# Patient Record
Sex: MALE | Race: WHITE | Employment: PART TIME | URBAN - METROPOLITAN AREA
[De-identification: names, ages, dates, MRNs, and addresses within clinical notes are randomized per-mention and may not be internally consistent; named-entity substitution may affect disease eponyms.]

---

## 2017-01-26 ENCOUNTER — TELEPHONE (OUTPATIENT)
Dept: INTERNAL MEDICINE CLINIC | Age: 27
End: 2017-01-26

## 2017-01-27 ENCOUNTER — OFFICE VISIT (OUTPATIENT)
Dept: INTERNAL MEDICINE CLINIC | Age: 27
End: 2017-01-27

## 2017-01-27 VITALS
BODY MASS INDEX: 31.67 KG/M2 | HEART RATE: 100 BPM | RESPIRATION RATE: 16 BRPM | HEIGHT: 68 IN | DIASTOLIC BLOOD PRESSURE: 70 MMHG | WEIGHT: 209 LBS | SYSTOLIC BLOOD PRESSURE: 110 MMHG

## 2017-01-27 DIAGNOSIS — M50.30 DDD (DEGENERATIVE DISC DISEASE), CERVICAL: ICD-10-CM

## 2017-01-27 DIAGNOSIS — F41.9 ANXIETY: Primary | ICD-10-CM

## 2017-01-27 DIAGNOSIS — M54.2 NECK PAIN: ICD-10-CM

## 2017-01-27 PROCEDURE — 99214 OFFICE O/P EST MOD 30 MIN: CPT | Performed by: INTERNAL MEDICINE

## 2017-01-27 RX ORDER — TIZANIDINE HYDROCHLORIDE 2 MG/1
2 CAPSULE, GELATIN COATED ORAL 2 TIMES DAILY PRN
Qty: 30 CAPSULE | Refills: 0 | Status: SHIPPED | OUTPATIENT
Start: 2017-01-27 | End: 2017-01-30

## 2017-01-27 RX ORDER — LITHIUM CARBONATE 300 MG/1
300 CAPSULE ORAL 2 TIMES DAILY WITH MEALS
COMMUNITY
Start: 2017-01-25 | End: 2020-12-23

## 2017-01-27 RX ORDER — ARIPIPRAZOLE 5 MG/1
5 TABLET ORAL DAILY
COMMUNITY
End: 2020-12-23

## 2017-01-27 RX ORDER — HYDROXYZINE PAMOATE 25 MG/1
25 CAPSULE ORAL 2 TIMES DAILY
COMMUNITY
Start: 2017-01-25 | End: 2020-12-23

## 2017-01-27 ASSESSMENT — ENCOUNTER SYMPTOMS
SHORTNESS OF BREATH: 0
ABDOMINAL PAIN: 0

## 2017-01-30 RX ORDER — TIZANIDINE 2 MG/1
2 TABLET ORAL 2 TIMES DAILY PRN
Qty: 30 TABLET | Refills: 0 | Status: SHIPPED | OUTPATIENT
Start: 2017-01-30 | End: 2017-01-31

## 2017-01-31 RX ORDER — CYCLOBENZAPRINE HCL 10 MG
10 TABLET ORAL 2 TIMES DAILY PRN
Qty: 30 TABLET | Refills: 0 | OUTPATIENT
Start: 2017-01-31 | End: 2017-02-10

## 2017-02-13 ENCOUNTER — OFFICE VISIT (OUTPATIENT)
Dept: ORTHOPEDIC SURGERY | Age: 27
End: 2017-02-13

## 2017-02-13 VITALS — WEIGHT: 213 LBS | HEIGHT: 70 IN | BODY MASS INDEX: 30.49 KG/M2 | RESPIRATION RATE: 16 BRPM

## 2017-02-13 DIAGNOSIS — S63.501A SPRAIN OF WRIST, RIGHT, INITIAL ENCOUNTER: Primary | ICD-10-CM

## 2017-02-13 PROCEDURE — 99203 OFFICE O/P NEW LOW 30 MIN: CPT | Performed by: ORTHOPAEDIC SURGERY

## 2020-12-21 PROBLEM — Z00.00 PREVENTATIVE HEALTH CARE: Status: ACTIVE | Noted: 2020-12-21

## 2020-12-31 ENCOUNTER — HOSPITAL ENCOUNTER (EMERGENCY)
Age: 30
Discharge: HOME | End: 2020-12-31
Attending: EMERGENCY MEDICINE
Payer: COMMERCIAL

## 2020-12-31 ENCOUNTER — APPOINTMENT (OUTPATIENT)
Dept: CT IMAGING | Age: 30
End: 2020-12-31
Payer: COMMERCIAL

## 2020-12-31 VITALS
DIASTOLIC BLOOD PRESSURE: 79 MMHG | RESPIRATION RATE: 12 BRPM | OXYGEN SATURATION: 100 % | TEMPERATURE: 97.2 F | HEIGHT: 70 IN | SYSTOLIC BLOOD PRESSURE: 156 MMHG | BODY MASS INDEX: 23.48 KG/M2 | WEIGHT: 164.02 LBS | HEART RATE: 72 BPM

## 2020-12-31 DIAGNOSIS — R11.2 NON-INTRACTABLE VOMITING WITH NAUSEA, UNSPECIFIED VOMITING TYPE: Primary | ICD-10-CM

## 2020-12-31 DIAGNOSIS — R10.13 ABDOMINAL PAIN, EPIGASTRIC: ICD-10-CM

## 2020-12-31 DIAGNOSIS — E87.20 LACTIC ACIDOSIS: ICD-10-CM

## 2020-12-31 LAB
A/G RATIO: 1.4 (ref 1.1–2.2)
ALBUMIN SERPL-MCNC: 4.3 G/DL (ref 3.4–5)
ALP BLD-CCNC: 68 U/L (ref 40–129)
ALT SERPL-CCNC: 214 U/L (ref 10–40)
AMORPHOUS: ABNORMAL /HPF
AMPHETAMINE SCREEN, URINE: NORMAL
ANION GAP SERPL CALCULATED.3IONS-SCNC: 15 MMOL/L (ref 3–16)
AST SERPL-CCNC: 207 U/L (ref 15–37)
BARBITURATE SCREEN URINE: NORMAL
BASOPHILS ABSOLUTE: 0 K/UL (ref 0–0.2)
BASOPHILS RELATIVE PERCENT: 0.2 %
BENZODIAZEPINE SCREEN, URINE: NORMAL
BILIRUB SERPL-MCNC: 0.8 MG/DL (ref 0–1)
BILIRUBIN URINE: ABNORMAL
BLOOD, URINE: NEGATIVE
BUN BLDV-MCNC: 17 MG/DL (ref 7–20)
CALCIUM SERPL-MCNC: 9.8 MG/DL (ref 8.3–10.6)
CANNABINOID SCREEN URINE: NORMAL
CHLORIDE BLD-SCNC: 104 MMOL/L (ref 99–110)
CLARITY: CLEAR
CO2: 25 MMOL/L (ref 21–32)
COCAINE METABOLITE SCREEN URINE: NORMAL
COLOR: YELLOW
CREAT SERPL-MCNC: 0.8 MG/DL (ref 0.9–1.3)
EOSINOPHILS ABSOLUTE: 0 K/UL (ref 0–0.6)
EOSINOPHILS RELATIVE PERCENT: 0.2 %
EPITHELIAL CELLS, UA: ABNORMAL /HPF (ref 0–5)
GFR AFRICAN AMERICAN: >60
GFR NON-AFRICAN AMERICAN: >60
GLOBULIN: 3.1 G/DL
GLUCOSE BLD-MCNC: 122 MG/DL (ref 70–99)
GLUCOSE URINE: NEGATIVE MG/DL
HCT VFR BLD CALC: 45.9 % (ref 40.5–52.5)
HEMOGLOBIN: 15.9 G/DL (ref 13.5–17.5)
KETONES, URINE: >=80 MG/DL
LACTIC ACID: 1.3 MMOL/L (ref 0.4–2)
LACTIC ACID: 3.2 MMOL/L (ref 0.4–2)
LEUKOCYTE ESTERASE, URINE: NEGATIVE
LIPASE: 20 U/L (ref 13–60)
LYMPHOCYTES ABSOLUTE: 0.5 K/UL (ref 1–5.1)
LYMPHOCYTES RELATIVE PERCENT: 5 %
Lab: NORMAL
MCH RBC QN AUTO: 30.7 PG (ref 26–34)
MCHC RBC AUTO-ENTMCNC: 34.7 G/DL (ref 31–36)
MCV RBC AUTO: 88.6 FL (ref 80–100)
METHADONE SCREEN, URINE: NORMAL
MICROSCOPIC EXAMINATION: YES
MONOCYTES ABSOLUTE: 0.8 K/UL (ref 0–1.3)
MONOCYTES RELATIVE PERCENT: 7.8 %
MUCUS: ABNORMAL /LPF
NEUTROPHILS ABSOLUTE: 8.4 K/UL (ref 1.7–7.7)
NEUTROPHILS RELATIVE PERCENT: 86.8 %
NITRITE, URINE: NEGATIVE
OPIATE SCREEN URINE: NORMAL
OXYCODONE URINE: NORMAL
PDW BLD-RTO: 12.1 % (ref 12.4–15.4)
PH UA: 8.5
PH UA: 8.5 (ref 5–8)
PHENCYCLIDINE SCREEN URINE: NORMAL
PLATELET # BLD: 215 K/UL (ref 135–450)
PMV BLD AUTO: 7.7 FL (ref 5–10.5)
POTASSIUM SERPL-SCNC: 3.6 MMOL/L (ref 3.5–5.1)
PROPOXYPHENE SCREEN: NORMAL
PROTEIN UA: ABNORMAL MG/DL
RBC # BLD: 5.18 M/UL (ref 4.2–5.9)
RBC UA: ABNORMAL /HPF (ref 0–4)
SODIUM BLD-SCNC: 144 MMOL/L (ref 136–145)
SPECIFIC GRAVITY UA: 1.02 (ref 1–1.03)
TOTAL PROTEIN: 7.4 G/DL (ref 6.4–8.2)
URINE REFLEX TO CULTURE: ABNORMAL
URINE TYPE: ABNORMAL
UROBILINOGEN, URINE: 0.2 E.U./DL
WBC # BLD: 9.6 K/UL (ref 4–11)
WBC UA: ABNORMAL /HPF (ref 0–5)

## 2020-12-31 PROCEDURE — 96375 TX/PRO/DX INJ NEW DRUG ADDON: CPT

## 2020-12-31 PROCEDURE — 99284 EMERGENCY DEPT VISIT MOD MDM: CPT

## 2020-12-31 PROCEDURE — 74177 CT ABD & PELVIS W/CONTRAST: CPT

## 2020-12-31 PROCEDURE — 6360000002 HC RX W HCPCS: Performed by: EMERGENCY MEDICINE

## 2020-12-31 PROCEDURE — 80307 DRUG TEST PRSMV CHEM ANLYZR: CPT

## 2020-12-31 PROCEDURE — 85025 COMPLETE CBC W/AUTO DIFF WBC: CPT

## 2020-12-31 PROCEDURE — 2580000003 HC RX 258: Performed by: EMERGENCY MEDICINE

## 2020-12-31 PROCEDURE — 6370000000 HC RX 637 (ALT 250 FOR IP): Performed by: EMERGENCY MEDICINE

## 2020-12-31 PROCEDURE — 83690 ASSAY OF LIPASE: CPT

## 2020-12-31 PROCEDURE — 36415 COLL VENOUS BLD VENIPUNCTURE: CPT

## 2020-12-31 PROCEDURE — 83605 ASSAY OF LACTIC ACID: CPT

## 2020-12-31 PROCEDURE — 80053 COMPREHEN METABOLIC PANEL: CPT

## 2020-12-31 PROCEDURE — 96361 HYDRATE IV INFUSION ADD-ON: CPT

## 2020-12-31 PROCEDURE — 81001 URINALYSIS AUTO W/SCOPE: CPT

## 2020-12-31 PROCEDURE — 96374 THER/PROPH/DIAG INJ IV PUSH: CPT

## 2020-12-31 PROCEDURE — 6360000004 HC RX CONTRAST MEDICATION: Performed by: EMERGENCY MEDICINE

## 2020-12-31 RX ORDER — MORPHINE SULFATE 4 MG/ML
4 INJECTION, SOLUTION INTRAMUSCULAR; INTRAVENOUS
Status: DISCONTINUED | OUTPATIENT
Start: 2020-12-31 | End: 2020-12-31 | Stop reason: HOSPADM

## 2020-12-31 RX ORDER — HYOSCYAMINE SULFATE 0.125 MG
0.12 TABLET ORAL EVERY 4 HOURS PRN
Qty: 30 TABLET | Refills: 0 | Status: SHIPPED | OUTPATIENT
Start: 2020-12-31 | End: 2020-12-31 | Stop reason: SDUPTHER

## 2020-12-31 RX ORDER — ONDANSETRON 2 MG/ML
8 INJECTION INTRAMUSCULAR; INTRAVENOUS ONCE
Status: COMPLETED | OUTPATIENT
Start: 2020-12-31 | End: 2020-12-31

## 2020-12-31 RX ORDER — 0.9 % SODIUM CHLORIDE 0.9 %
1000 INTRAVENOUS SOLUTION INTRAVENOUS ONCE
Status: COMPLETED | OUTPATIENT
Start: 2020-12-31 | End: 2020-12-31

## 2020-12-31 RX ORDER — PROMETHAZINE HYDROCHLORIDE 25 MG/1
25 TABLET ORAL EVERY 6 HOURS PRN
Qty: 10 TABLET | Refills: 0 | Status: SHIPPED | OUTPATIENT
Start: 2020-12-31 | End: 2020-12-31 | Stop reason: SDUPTHER

## 2020-12-31 RX ORDER — HYOSCYAMINE SULFATE 0.125 MG
0.12 TABLET ORAL EVERY 4 HOURS PRN
Qty: 30 TABLET | Refills: 0 | Status: SHIPPED | OUTPATIENT
Start: 2020-12-31 | End: 2022-01-21 | Stop reason: CLARIF

## 2020-12-31 RX ORDER — PROMETHAZINE HYDROCHLORIDE 25 MG/ML
25 INJECTION, SOLUTION INTRAMUSCULAR; INTRAVENOUS ONCE
Status: COMPLETED | OUTPATIENT
Start: 2020-12-31 | End: 2020-12-31

## 2020-12-31 RX ORDER — PROMETHAZINE HYDROCHLORIDE 25 MG/1
25 TABLET ORAL EVERY 6 HOURS PRN
Qty: 10 TABLET | Refills: 0 | Status: SHIPPED | OUTPATIENT
Start: 2020-12-31 | End: 2021-01-07

## 2020-12-31 RX ADMIN — ONDANSETRON 8 MG: 2 INJECTION INTRAMUSCULAR; INTRAVENOUS at 13:07

## 2020-12-31 RX ADMIN — MORPHINE SULFATE 4 MG: 4 INJECTION INTRAVENOUS at 13:07

## 2020-12-31 RX ADMIN — HYDROMORPHONE HYDROCHLORIDE 0.5 MG: 1 INJECTION, SOLUTION INTRAMUSCULAR; INTRAVENOUS; SUBCUTANEOUS at 14:08

## 2020-12-31 RX ADMIN — IOVERSOL 100 ML: 678 INJECTION INTRA-ARTERIAL; INTRAVENOUS at 13:40

## 2020-12-31 RX ADMIN — SODIUM CHLORIDE 1000 ML: 9 INJECTION, SOLUTION INTRAVENOUS at 12:51

## 2020-12-31 RX ADMIN — SODIUM CHLORIDE 1000 ML: 9 INJECTION, SOLUTION INTRAVENOUS at 14:08

## 2020-12-31 RX ADMIN — HYOSCYAMINE SULFATE 125 MCG: 0.12 TABLET ORAL; SUBLINGUAL at 14:04

## 2020-12-31 RX ADMIN — PROMETHAZINE HYDROCHLORIDE 25 MG: 25 INJECTION INTRAMUSCULAR; INTRAVENOUS at 14:04

## 2020-12-31 ASSESSMENT — PAIN DESCRIPTION - PAIN TYPE
TYPE: ACUTE PAIN
TYPE: ACUTE PAIN

## 2020-12-31 ASSESSMENT — PAIN DESCRIPTION - DESCRIPTORS
DESCRIPTORS: CRAMPING

## 2020-12-31 ASSESSMENT — PAIN SCALES - GENERAL
PAINLEVEL_OUTOF10: 10
PAINLEVEL_OUTOF10: 8
PAINLEVEL_OUTOF10: 10

## 2020-12-31 ASSESSMENT — PAIN - FUNCTIONAL ASSESSMENT: PAIN_FUNCTIONAL_ASSESSMENT: 0-10

## 2020-12-31 ASSESSMENT — PAIN DESCRIPTION - LOCATION
LOCATION: ABDOMEN
LOCATION: ABDOMEN;BACK

## 2020-12-31 NOTE — ED NOTES
Unable to lie still, up and around room and area. Moaning, grunting. Multiple small emesis.      Rissa Samuels RN  12/31/20 2278

## 2020-12-31 NOTE — ED PROVIDER NOTES
[address] [facility] PROVIDER NOTE    Patient Identification  Pt Name: Yaquelin Anand  MRN: [id number]  Denisa 1990  Date of evaluation: 12/31/2020  Provider: Constanza Rios MD  PCP: Lin Redd MD    Chief Complaint  Abdominal Pain (severe cramping and pain x 6 hours) and Emesis (x 3 hours, no diarrhea)      HPI  (History provided by patient)  This is a 26 y.o. male who was brought in by self for sudden onset of severe epigastric abdominal pain starting approximately 6 hours ago. He then developed vomiting approximately 3 hours after that. He has not had any diarrhea or constipation. Patient states he felt well before this, including feeling well going to bed last night. He has not been ill recently. He denies any fever or chills. Pain is localized to his epigastrium, described as sharp and cramping, with radiation to the back. It is localized bilaterally. Nothing seems to make it better or worse. Patient is denying associated chest pain or shortness of breath. He has not had hematemesis or hematochezia. He has never had anything like this before. He has had no recent sick contacts. He denies illicit drug use or alcohol use. ROS  10 systems reviewed, pertinent positives/negatives per HPI otherwise noted to be negative. I have reviewed the following nursing documentation:  Allergies: Patient has no known allergies. Past medical history:   Past Medical History:   Diagnosis Date    Asthma      Past surgical history: History reviewed. No pertinent surgical history. Home medications:   Current Discharge Medication List      CONTINUE these medications which have NOT CHANGED    Details   atorvastatin (LIPITOR) 10 MG tablet Take 1 tablet by mouth daily  Qty: 30 tablet, Refills: 3             Social history:  reports that he has never smoked. He has never used smokeless tobacco. He reports current alcohol use. He reports that he does not use drugs.     Family history:  History reviewed. No pertinent family history. Exam  ED Triage Vitals [12/31/20 1229]   BP Temp Temp Source Pulse Resp SpO2 Height Weight   (!) 156/79 97.2 °F (36.2 °C) Skin 72 12 100 % 5' 10\" (1.778 m) 164 lb 0.4 oz (74.4 kg)     Nursing note and vitals reviewed. Constitutional: Appears uncomfortable and in acute pain distress. HENT:      Head: Normocephalic and atraumatic. Ears: External ears normal.      Nose: Nose normal.     Mouth: Membrane mucosa dry. Eyes: Anicteric sclera. No discharge. Neck: Supple. Trachea midline. Cardiovascular: RRR; no murmurs, rubs, or gallops. Pulmonary/Chest: Effort normal. No respiratory distress. CTAB. No stridor. No wheezes. No rales. Abdominal: Firm, nondistended abdomen. Tender to palpation in the epigastrium and bilateral upper quadrants with guarding, but no rebound. Other quadrants tender without guarding. Bowel sounds diminished. Musculoskeletal: Moves all extremities. No gross deformity. Neurological: Alert and oriented. Face symmetric. Speech is clear. Skin: Warm and dry. No rash. Psychiatric: Normal mood and affect. Behavior is normal.    Radiology  CT ABDOMEN PELVIS W IV CONTRAST Additional Contrast? None   Final Result   Negative CT of the abdomen/pelvis as discussed.              Labs  Results for orders placed or performed during the hospital encounter of 12/31/20   CBC Auto Differential   Result Value Ref Range    WBC 9.6 4.0 - 11.0 K/uL    RBC 5.18 4.20 - 5.90 M/uL    Hemoglobin 15.9 13.5 - 17.5 g/dL    Hematocrit 45.9 40.5 - 52.5 %    MCV 88.6 80.0 - 100.0 fL    MCH 30.7 26.0 - 34.0 pg    MCHC 34.7 31.0 - 36.0 g/dL    RDW 12.1 (L) 12.4 - 15.4 %    Platelets 218 950 - 098 K/uL    MPV 7.7 5.0 - 10.5 fL    Neutrophils % 86.8 %    Lymphocytes % 5.0 %    Monocytes % 7.8 %    Eosinophils % 0.2 %    Basophils % 0.2 %    Neutrophils Absolute 8.4 (H) 1.7 - 7.7 K/uL    Lymphocytes Absolute 0.5 (L) 1.0 - 5.1 K/uL    Monocytes Absolute 0.8 0.0 - 1.3 K/uL    Eosinophils Absolute 0.0 0.0 - 0.6 K/uL    Basophils Absolute 0.0 0.0 - 0.2 K/uL   Comprehensive Metabolic Panel   Result Value Ref Range    Sodium 144 136 - 145 mmol/L    Potassium 3.6 3.5 - 5.1 mmol/L    Chloride 104 99 - 110 mmol/L    CO2 25 21 - 32 mmol/L    Anion Gap 15 3 - 16    Glucose 122 (H) 70 - 99 mg/dL    BUN 17 7 - 20 mg/dL    CREATININE 0.8 (L) 0.9 - 1.3 mg/dL    GFR Non-African American >60 >60    GFR African American >60 >60    Calcium 9.8 8.3 - 10.6 mg/dL    Total Protein 7.4 6.4 - 8.2 g/dL    Alb 4.3 3.4 - 5.0 g/dL    Albumin/Globulin Ratio 1.4 1.1 - 2.2    Total Bilirubin 0.8 0.0 - 1.0 mg/dL    Alkaline Phosphatase 68 40 - 129 U/L     (H) 10 - 40 U/L     (H) 15 - 37 U/L    Globulin 3.1 g/dL   Urinalysis Reflex to Culture    Specimen: Urine, clean catch   Result Value Ref Range    Color, UA Yellow Straw/Yellow    Clarity, UA Clear Clear    Glucose, Ur Negative Negative mg/dL    Bilirubin Urine SMALL (A) Negative    Ketones, Urine >=80 (A) Negative mg/dL    Specific Gravity, UA 1.020 1.005 - 1.030    Blood, Urine Negative Negative    pH, UA 8.5 (A) 5.0 - 8.0    Protein, UA TRACE (A) Negative mg/dL    Urobilinogen, Urine 0.2 <2.0 E.U./dL    Nitrite, Urine Negative Negative    Leukocyte Esterase, Urine Negative Negative    Microscopic Examination YES     Urine Type NotGiven     Urine Reflex to Culture Not Indicated    Lactic Acid, Plasma   Result Value Ref Range    Lactic Acid 3.2 (H) 0.4 - 2.0 mmol/L   Urine Drug Screen   Result Value Ref Range    Amphetamine Screen, Urine Neg Negative <1000ng/mL    Barbiturate Screen, Ur Neg Negative <200 ng/mL    Benzodiazepine Screen, Urine Neg Negative <200 ng/mL    Cannabinoid Scrn, Ur Neg Negative <50 ng/mL    Cocaine Metabolite Screen, Urine Neg Negative <300 ng/mL    Opiate Scrn, Ur Neg Negative <300 ng/mL    PCP Screen, Urine Neg Negative <25 ng/mL    Methadone Screen, Urine Neg Negative <300 ng/mL    Propoxyphene Scrn, Ur Neg Negative <300 ng/mL    Oxycodone Urine Neg Negative <100 ng/ml    pH, UA 8.5     Drug Screen Comment: see below    Lipase   Result Value Ref Range    Lipase 20.0 13.0 - 60.0 U/L   Microscopic Urinalysis   Result Value Ref Range    Mucus, UA 2+ (A) None Seen /LPF    WBC, UA 0-2 0 - 5 /HPF    RBC, UA None seen 0 - 4 /HPF    Epithelial Cells, UA 0-1 0 - 5 /HPF    Amorphous, UA 1+ /HPF   Lactic Acid, Plasma   Result Value Ref Range    Lactic Acid 1.3 0.4 - 2.0 mmol/L         MDM and ED Course  Patient was in severe pain on arrival I was concerned for serious diagnoses. I performed a broad work-up, occluding CT of his abdomen. Fortunately, his work-up was mostly unremarkable. He had no leukocytosis, liver enzymes are normal, and his kidney function remained normal.  Further, CT showed no evidence of acute abnormality or surgical abdomen. He did show signs of severe dehydration with ketones in his urine and a lactic acid of 3.2. Despite this, the patient was not acidotic on his metabolic panel. After receiving 2 L of IV fluid, he showed significant improvement in his lactic acid returned to normal.  The patient's pain almost completely resolved after treatment in the emergency department, as did his nausea and vomiting. He was able to tolerate oral fluid intake at the end of his visit, which is also reassuring. At this time, he is safe and appropriate for discharge home. I estimate there is LOW risk for ACUTE APPENDICITIS, BOWEL OBSTRUCTION, CHOLECYSTITIS, DIVERTICULITIS, INCARCERATED HERNIA, PANCREATITIS, MESENTERIC ISCHEMIA, ABDOMINAL AORTIC ANEURYSM/DISSECTION, PERFORATED BOWEL, and PERFORATED ULCER, thus I consider the discharge disposition reasonable. Zeus Rossi and I have discussed the diagnosis and risks, and we agree with discharging home with PCP follow-up. We also discussed returning to the Emergency Department immediately if new or worsening symptoms occur.  We have discussed the symptoms which are most concerning (e.g., bloody stool, fever, changing or worsening pain, intractable vomiting, chest pain) that necessitate immediate return. Final Impression  1. Non-intractable vomiting with nausea, unspecified vomiting type    2. Abdominal pain, epigastric    3. Lactic acidosis        Blood pressure (!) 156/79, pulse 72, temperature 97.2 °F (36.2 °C), temperature source Skin, resp. rate 12, height 5' 10\" (1.778 m), weight 164 lb 0.4 oz (74.4 kg), SpO2 100 %. Disposition:  Discharge      Patient Referrals:  Maday Agee MD  [facility] [address] [facility]  [phone]    In 2 days  for re-evaluation    [place] [address]  [phone]    As needed, If symptoms worsen or new symptoms develop      Discharge Medications:  Current Discharge Medication List      START taking these medications    Details   hyoscyamine (LEVSIN) 125 MCG tablet Take 1 tablet by mouth every 4 hours as needed for Cramping  Qty: 30 tablet, Refills: 0      promethazine (PHENERGAN) 25 MG tablet Take 1 tablet by mouth every 6 hours as needed for Nausea  Qty: 10 tablet, Refills: 0             This chart was generated using the [address] [place] [address] [address] dictation system. I created this record but it may contain dictation errors given the limitations of this technology.        Liz Lyons MD  12/31/20 1826

## 2020-12-31 NOTE — ED NOTES
Continues to vomit small amounts, Dr. Luis Snyder aware.  Very restless, unable to lie still     Pooja Marsh RN  12/31/20 8025

## 2021-01-20 PROBLEM — Z00.00 PREVENTATIVE HEALTH CARE: Status: RESOLVED | Noted: 2020-12-21 | Resolved: 2021-01-20

## 2021-03-21 ENCOUNTER — HOSPITAL ENCOUNTER (EMERGENCY)
Age: 31
Discharge: HOME | End: 2021-03-21
Attending: EMERGENCY MEDICINE
Payer: COMMERCIAL

## 2021-03-21 VITALS
BODY MASS INDEX: 23.63 KG/M2 | TEMPERATURE: 98.1 F | HEART RATE: 55 BPM | OXYGEN SATURATION: 98 % | WEIGHT: 164.68 LBS | RESPIRATION RATE: 17 BRPM

## 2021-03-21 DIAGNOSIS — S61.210A LACERATION OF RIGHT INDEX FINGER WITHOUT FOREIGN BODY WITHOUT DAMAGE TO NAIL, INITIAL ENCOUNTER: Primary | ICD-10-CM

## 2021-03-21 DIAGNOSIS — Z23 NEED FOR TETANUS BOOSTER: ICD-10-CM

## 2021-03-21 PROCEDURE — 90471 IMMUNIZATION ADMIN: CPT | Performed by: EMERGENCY MEDICINE

## 2021-03-21 PROCEDURE — 99284 EMERGENCY DEPT VISIT MOD MDM: CPT

## 2021-03-21 PROCEDURE — 90715 TDAP VACCINE 7 YRS/> IM: CPT | Performed by: EMERGENCY MEDICINE

## 2021-03-21 PROCEDURE — 12002 RPR S/N/AX/GEN/TRNK2.6-7.5CM: CPT

## 2021-03-21 PROCEDURE — 6360000002 HC RX W HCPCS: Performed by: EMERGENCY MEDICINE

## 2021-03-21 RX ADMIN — TETANUS TOXOID, REDUCED DIPHTHERIA TOXOID AND ACELLULAR PERTUSSIS VACCINE, ADSORBED 0.5 ML: 5; 2.5; 8; 8; 2.5 SUSPENSION INTRAMUSCULAR at 21:51

## 2021-03-21 ASSESSMENT — PAIN SCALES - GENERAL
PAINLEVEL_OUTOF10: 1
PAINLEVEL_OUTOF10: 2

## 2021-03-21 ASSESSMENT — PAIN - FUNCTIONAL ASSESSMENT: PAIN_FUNCTIONAL_ASSESSMENT: 0-10

## 2021-03-21 ASSESSMENT — PAIN DESCRIPTION - PAIN TYPE: TYPE: ACUTE PAIN

## 2021-03-22 NOTE — ED PROVIDER NOTES
CHIEF COMPLAINT  Laceration (Right index finger laceration with a non serrated knife. bleeding stopped upon arrival. gaping. Denies pain but burning sensation)      HISTORY OF PRESENT ILLNESS  Pat Castillo is a 26 y.o. male presents to the ED with right index finger laceration, accidentally sliced palmar aspect of finger while trying to cut something, states he can still move everything ok so he doesn't think he cut any tendons, was more worried he cut a blood vessel, denies concern for foreign body, knife was intact, with a clean non-serrated knife just prior to arrival, he was concerned because he couldn't get the bleeding to stop on his own, usually just sews them up himself, unsure last tetanus shot, does not believe it's been within the past 5 years so he is due, no hx of diabetes or poor wound healing/frequent infections, no recent fever/illness, no cough or covid exposure he is aware of. No other complaints, modifying factors or associated symptoms. I have reviewed the following from the nursing documentation. Past Medical History:   Diagnosis Date    Asthma      History reviewed. No pertinent surgical history. History reviewed. No pertinent family history. Social History     Socioeconomic History    Marital status: Single     Spouse name: Not on file    Number of children: Not on file    Years of education: Not on file    Highest education level: Not on file   Occupational History    Not on file   Social Needs    Financial resource strain: Not hard at all    Food insecurity     Worry: Never true     Inability: Never true   [employer] needs     Medical: No     Non-medical: No   Tobacco Use    Smoking status: Never Smoker    Smokeless tobacco: Never Used   Substance and Sexual Activity    Alcohol use: Not Currently     Comment:  Occ    Drug use: No    Sexual activity: Not on file   Lifestyle    Physical activity     Days per week: Not on file     Minutes per session: Not on file    Stress: Not on file   Relationships    Social connections     Talks on phone: Not on file     Gets together: Not on file     Attends Buddhism service: Not on file     Active member of club or organization: Not on file     Attends meetings of clubs or organizations: Not on file     Relationship status: Not on file    Intimate partner violence     Fear of current or ex partner: Not on file     Emotionally abused: Not on file     Physically abused: Not on file     Forced sexual activity: Not on file   Other Topics Concern    Not on file   Social History Narrative    Not on file     No current facility-administered medications for this encounter. Current Outpatient Medications   Medication Sig Dispense Refill    hyoscyamine (LEVSIN) 125 MCG tablet Take 1 tablet by mouth every 4 hours as needed for Cramping 30 tablet 0     No Known Allergies    REVIEW OF SYSTEMS  10 systems reviewed, pertinent positives per HPI otherwise noted to be negative. PHYSICAL EXAM  Pulse 55   Temp 98.1 °F (36.7 °C) (Oral)   Resp 17   Wt 164 lb 10.9 oz (74.7 kg)   SpO2 98%   BMI 23.63 kg/m²   GENERAL APPEARANCE: Awake and alert. Cooperative. No acute distress  HEAD: Normocephalic. Atraumatic. EYES: PERRL. EOM's grossly intact. ENT: Mucous membranes are moist. Airway patent  NECK: Supple. No rigidity  HEART: RRR. No murmurs  LUNGS: Respirations nonlabored. Lungs are CTAB. EXTREMITIES: No peripheral edema. Moves all extremities equally. R upper extremity: 2+ radial pulse, <2sec cap refill in all digits, distal sensation intact in all digits, nml hand tendon exam, full flexion/extension and strength in R index finger intact, approx 3cm laceration across proximal palmar aspect of index finger, along crease of MCP joint and extending into medial aspect of digit, no evidence of foreign body or gross contamination, no visible tendon, oozing dark red blood, no pulsatile bleeding  SKIN: Warm and dry. No acute rashes. NEUROLOGICAL: Alert and oriented. No gross facial drooping. Normal speech, steady gait  PSYCHIATRIC: Normal mood and affect. PROCEDURES  Patient Name: Courtney Turner   Medical Record Number: [id number]  Date: 3/25/2021   Time: 5:00 PM   Room/Bed: QC 07/07  Laceration Repair Procedure Note  Indication: Laceration, R index finger    Procedure: The patient was placed in the appropriate position and anesthesia around the laceration was not performed at the patient's request. The area was then cleansed using chlorhexidine, irrigated with normal saline, explored with no foreign bodies discovered and no tendon injury noted and draped in a sterile fashion. Laceration through subQ tissue, no visible tendon injury. The laceration was closed with 4-0 Prolene using interrupted sutures. There were no additional lacerations requiring repair. The wound area was then dressed with bacitracin, a sterile dressing, gauze and tape. Total repaired wound length: 3 cm. Other Items: Suture count: 5    The patient tolerated the procedure well. Complications: None, EBL 3cc      ED COURSE/MDM  Patient seen and evaluated. Old records reviewed. 31yo M w/ R index finger laceration, d/t location and gaping of skin, repaired w/ sutures, discussed proper wound care and given dressings/abx ointment for home, patient required tetanus booster and was agreeable to receive this, no further bleeding, no hematoma formation, suture removal by medical professional encouraged in 1 week, low suspicion for fracture/dislocation/foreign body, neurovascularly intact and tendon exam intact, Strict return precautions given, all questions answered, will return if any worsening symptoms or new concerns, see AVS for further discharge information, patient verbalized understanding of plan, felt comfortable going home.       Orders Placed This Encounter   Medications    Tetanus-Diphth-Acell Pertussis (BOOSTRIX) injection 0.5 mL     ED Course as of Mar 25 1700   Sun Mar 21, 2021   2202 Five 4-0 Prolene sutures placed in the proximal right index finger, hemostasis achieved. [SY]      ED Course User Index  [SY] Indy Vora DO       CLINICAL IMPRESSION  1. Laceration of right index finger without foreign body without damage to nail, initial encounter    2. Need for tetanus booster        Pulse 55, temperature 98.1 °F (36.7 °C), temperature source Oral, resp. rate 17, weight 164 lb 10.9 oz (74.7 kg), SpO2 98 %. DISPOSITION  Ruy Khalil was discharged to home in stable condition.                   Indy Vora DO  03/25/21 9992

## 2021-03-22 NOTE — ED NOTES
Bacitracin applied over sutured right index finger. Secured with 2x2 and light ace wrap. Pt tolerated well.       Jacquelyn Anders RN  03/21/21 8264

## 2021-03-22 NOTE — ED NOTES
Cleansed right index finger laceration with antiseptic soap and saline.  Pt tolerated well     Latonya Mirza RN  03/21/21 2106       Latonya Mirza RN  03/21/21 2106

## 2021-12-20 ENCOUNTER — APPOINTMENT (OUTPATIENT)
Dept: GENERAL RADIOLOGY | Age: 31
End: 2021-12-20
Payer: COMMERCIAL

## 2021-12-20 ENCOUNTER — HOSPITAL ENCOUNTER (EMERGENCY)
Age: 31
Discharge: HOME | End: 2021-12-20
Attending: EMERGENCY MEDICINE
Payer: COMMERCIAL

## 2021-12-20 VITALS
OXYGEN SATURATION: 99 % | WEIGHT: 148.81 LBS | RESPIRATION RATE: 20 BRPM | SYSTOLIC BLOOD PRESSURE: 130 MMHG | HEIGHT: 70 IN | HEART RATE: 84 BPM | DIASTOLIC BLOOD PRESSURE: 85 MMHG | BODY MASS INDEX: 21.3 KG/M2 | TEMPERATURE: 98.1 F

## 2021-12-20 DIAGNOSIS — R19.7 NAUSEA VOMITING AND DIARRHEA: ICD-10-CM

## 2021-12-20 DIAGNOSIS — R07.89 CHEST WALL PAIN: ICD-10-CM

## 2021-12-20 DIAGNOSIS — R11.2 NAUSEA VOMITING AND DIARRHEA: ICD-10-CM

## 2021-12-20 DIAGNOSIS — U07.1 COVID-19: Primary | ICD-10-CM

## 2021-12-20 LAB
BASOPHILS ABSOLUTE: 0 K/UL (ref 0–0.2)
BASOPHILS RELATIVE PERCENT: 0.3 %
EOSINOPHILS ABSOLUTE: 0 K/UL (ref 0–0.6)
EOSINOPHILS RELATIVE PERCENT: 0.1 %
HCT VFR BLD CALC: 47.9 % (ref 40.5–52.5)
HEMOGLOBIN: 15.6 G/DL (ref 13.5–17.5)
LYMPHOCYTES ABSOLUTE: 2 K/UL (ref 1–5.1)
LYMPHOCYTES RELATIVE PERCENT: 23.4 %
MCH RBC QN AUTO: 29.1 PG (ref 26–34)
MCHC RBC AUTO-ENTMCNC: 32.5 G/DL (ref 31–36)
MCV RBC AUTO: 89.5 FL (ref 80–100)
MONOCYTES ABSOLUTE: 0.6 K/UL (ref 0–1.3)
MONOCYTES RELATIVE PERCENT: 6.3 %
NEUTROPHILS ABSOLUTE: 6.1 K/UL (ref 1.7–7.7)
NEUTROPHILS RELATIVE PERCENT: 69.9 %
PDW BLD-RTO: 13.1 % (ref 12.4–15.4)
PLATELET # BLD: 237 K/UL (ref 135–450)
PMV BLD AUTO: 8.2 FL (ref 5–10.5)
RBC # BLD: 5.35 M/UL (ref 4.2–5.9)
SARS-COV-2, NAAT: DETECTED
WBC # BLD: 8.7 K/UL (ref 4–11)

## 2021-12-20 PROCEDURE — 99283 EMERGENCY DEPT VISIT LOW MDM: CPT

## 2021-12-20 PROCEDURE — 71046 X-RAY EXAM CHEST 2 VIEWS: CPT

## 2021-12-20 PROCEDURE — 93005 ELECTROCARDIOGRAM TRACING: CPT | Performed by: EMERGENCY MEDICINE

## 2021-12-20 PROCEDURE — 87635 SARS-COV-2 COVID-19 AMP PRB: CPT

## 2021-12-20 PROCEDURE — 85025 COMPLETE CBC W/AUTO DIFF WBC: CPT

## 2021-12-20 RX ORDER — ONDANSETRON 4 MG/1
4 TABLET, ORALLY DISINTEGRATING ORAL 3 TIMES DAILY PRN
Qty: 21 TABLET | Refills: 0 | Status: SHIPPED | OUTPATIENT
Start: 2021-12-20 | End: 2022-01-21 | Stop reason: CLARIF

## 2021-12-20 RX ORDER — ALBUTEROL SULFATE 90 UG/1
2 AEROSOL, METERED RESPIRATORY (INHALATION) 4 TIMES DAILY PRN
Qty: 18 G | Refills: 0 | Status: SHIPPED | OUTPATIENT
Start: 2021-12-20 | End: 2022-01-21 | Stop reason: CLARIF

## 2021-12-20 ASSESSMENT — ENCOUNTER SYMPTOMS
CHEST TIGHTNESS: 0
SHORTNESS OF BREATH: 0
VOMITING: 0
DIARRHEA: 1
COUGH: 1
NAUSEA: 1
ABDOMINAL PAIN: 0
EYES NEGATIVE: 1
SORE THROAT: 0

## 2021-12-20 ASSESSMENT — PAIN DESCRIPTION - FREQUENCY: FREQUENCY: CONTINUOUS

## 2021-12-20 ASSESSMENT — PAIN SCALES - GENERAL: PAINLEVEL_OUTOF10: 8

## 2021-12-20 ASSESSMENT — PAIN DESCRIPTION - PAIN TYPE: TYPE: ACUTE PAIN

## 2021-12-20 ASSESSMENT — PAIN - FUNCTIONAL ASSESSMENT: PAIN_FUNCTIONAL_ASSESSMENT: ACTIVITIES ARE NOT PREVENTED

## 2021-12-20 ASSESSMENT — PAIN DESCRIPTION - ONSET: ONSET: ON-GOING

## 2021-12-20 ASSESSMENT — PAIN DESCRIPTION - LOCATION: LOCATION: CHEST

## 2021-12-20 ASSESSMENT — PAIN DESCRIPTION - ORIENTATION: ORIENTATION: MID

## 2021-12-20 ASSESSMENT — PAIN DESCRIPTION - DESCRIPTORS: DESCRIPTORS: STABBING

## 2021-12-20 NOTE — ED PROVIDER NOTES
[facility]  [facility]      Pt Name: Yasmeen Szymanski  MRN: [id number]  [place] 1990  Date ofevaluation: 12/20/2021  Provider: Ratna Chappell MD    CHIEF COMPLAINT       Chief Complaint   Patient presents with    Chest Pain     x 5 days; mid sternal chest pain, SOB, palpitations   hx anxiety, no cardiac hx         HISTORY OF PRESENT ILLNESS   (Location/Symptom, Timing/Onset,Context/Setting, Quality, Duration, Modifying Factors, Severity)  Note limiting factors. Yasmeen Szymanski is a 33 y.o. male  who  has a past medical history of Asthma. 40-year-old male presents with mild, generalized, nonradiating achy chest pain associated with shortness of breath, chills, fever, diarrhea, nausea, myalgias which been present for the last 3 to 4 days. Patient has a history of asthma which is main risk factor. Patient is also unvaccinated for Covid. Patient has had sick contacts. Symptoms have come on gradually have been constant and worsening. Nothing seems to make them better or worse. Patient has not taken anything at home. No other associated symptoms besides those listed. NursingNotes were reviewed. REVIEW OF SYSTEMS    (2-9 systems for level 4, 10 or more for level 5)     Review of Systems   Constitutional: Positive for chills and fever. Negative for fatigue. HENT: Positive for congestion. Negative for sore throat. Eyes: Negative. Respiratory: Positive for cough. Negative for chest tightness and shortness of breath. Cardiovascular: Positive for chest pain. Gastrointestinal: Positive for diarrhea and nausea. Negative for abdominal pain and vomiting. Genitourinary: Negative. Musculoskeletal: Positive for myalgias. Skin: Negative. Neurological: Negative for dizziness, weakness, light-headedness and headaches. All other systems reviewed and are negative.       Except as noted above the remainder of the review of systems was reviewed and negative. PAST MEDICAL HISTORY     Past Medical History:   Diagnosis Date    Asthma          SURGICALHISTORY     No past surgical history on file. CURRENT MEDICATIONS       Previous Medications    HYOSCYAMINE (LEVSIN) 125 MCG TABLET    Take 1 tablet by mouth every 4 hours as needed for Cramping            Patient has no known allergies. FAMILY HISTORY     No family history on file. SOCIAL HISTORY       Social History     Socioeconomic History    Marital status: Single     Spouse name: Not on file    Number of children: Not on file    Years of education: Not on file    Highest education level: Not on file   Occupational History    Not on file   Tobacco Use    Smoking status: Never Smoker    Smokeless tobacco: Never Used   Substance and Sexual Activity    Alcohol use: Not Currently     Comment: Occ    Drug use: No    Sexual activity: Not on file   Other Topics Concern    Not on file   Social History Narrative    Not on file     Social Determinants of Health     Financial Resource Strain: Low Risk     Difficulty of Paying Living Expenses: Not hard at all   Food Insecurity: No Food Insecurity    Worried About Running Out of Food in the Last Year: Never true    Jonnathan of Food in the Last Year: Never true   Transportation Needs: No Transportation Needs    Lack of Transportation (Medical): No    Lack of Transportation (Non-Medical):  No   Physical Activity:     Days of Exercise per Week: Not on file    Minutes of Exercise per Session: Not on file   Stress:     Feeling of Stress : Not on file   Social Connections:     Frequency of Communication with Friends and Family: Not on file    Frequency of Social Gatherings with Friends and Family: Not on file    Attends Hoahaoism Services: Not on file    Active Member of Clubs or Organizations: Not on file    Attends Club or Organization Meetings: Not on file    Marital Status: Not on file   Intimate Partner Violence:     Fear of Current or Ex-Partner: Not on file    Emotionally Abused: Not on file    Physically Abused: Not on file    Sexually Abused: Not on file   Housing Stability:     Unable to Pay for Housing in the Last Year: Not on file    Number of Places Lived in the Last Year: Not on file    Unstable Housing in the Last Year: Not on file       SCREENINGS             PHYSICAL EXAM    (up to 7 for level 4, 8 or more for level 5)     ED Triage Vitals [12/20/21 1403]   BP Temp Temp src Pulse Resp SpO2 Height Weight   (!) 152/98 98.1 °F (36.7 °C) -- 74 16 99 % 5' 10\" (1.778 m) 148 lb 13 oz (67.5 kg)       Physical Exam  Vitals and nursing note reviewed. Constitutional:       General: He is not in acute distress. Appearance: He is well-developed and normal weight. He is not ill-appearing, toxic-appearing or diaphoretic. HENT:      Head: Normocephalic and atraumatic. Mouth/Throat:      Mouth: Mucous membranes are moist.      Pharynx: Oropharynx is clear. Eyes:      Extraocular Movements: Extraocular movements intact. Cardiovascular:      Rate and Rhythm: Normal rate and regular rhythm. Pulses: Normal pulses. Heart sounds: Normal heart sounds. Pulmonary:      Effort: Pulmonary effort is normal. No respiratory distress. Breath sounds: Normal breath sounds. No stridor. No decreased breath sounds, wheezing, rhonchi or rales. Chest:      Chest wall: No tenderness. Abdominal:      General: Bowel sounds are normal.      Palpations: Abdomen is soft. Tenderness: There is no abdominal tenderness. Musculoskeletal:         General: Normal range of motion. Cervical back: Normal range of motion and neck supple. Right lower leg: No edema. Left lower leg: No edema. Skin:     General: Skin is warm and dry. Capillary Refill: Capillary refill takes less than 2 seconds. Findings: No rash. Neurological:      General: No focal deficit present.       Mental the emergency department. -  Triage and nursing notes reviewed and incorporated. -  Old chart records reviewed and incorporated. -  Differential diagnosis includes: Differential diagnoses: Influenza, Other viral illness, Meningitis, Group A strep, Airway Obstruction, Pneumonia, embolism, ACS, hypoxemia, Dehydration, other. 27-year-old male who is found to be positive for Covid presenting with chest pain mild shortness of breath and other viral symptoms. Will give medicine for nausea as well as prescription for albuterol per requested. Patient's chest x-ray is otherwise unremarkable patient is saturating well on room air afebrile not tachycardic normotensive. No respiratory distress. Lungs are clear exam otherwise unremarkable. EKG without signs of ischemia. Will give patient isolation precautions instructions take Tylenol ibuprofen for pain and strict return precautions given for any new or worsening symptoms.    -  Work-up included:  See above  -  ED treatment included: See above  -  Results discussed with patient. REASSESSMENT       I estimate there is LOW risk for PULMONARY EMBOLISM, ACUTE CORONARY SYNDROME, OR THORACIC AORTIC DISSECTION, thus I consider the discharge disposition reasonable. I estimate there is LOW risk for PULMONARY EMBOLISM, PULMONARY EDEMA, PNEUMONIA, PNEUMOTHORAX, STATUS ASTHMATICUS, ACUTE RESPIRATORY FAILURE, OR ACUTE CORONARY SYNDROME, thus I consider the discharge disposition reasonable. The patient is at low risk for mortality based on demographic, history and clinical factors. Given the best available information and clinical assessment, I estimate the risk of hospitalization to be greater than risk of treatment at home. I have explained to the patient that the risk could rapidly change, given precautions for return and instructions. Explained to patient that the risk for mortality is low based on demographic, history and clinical factors.       I discussed with patient the results of evaluation in the ED, diagnosis, care, and prognosis. The plan is to discharge to home. Patient is in agreement with plan and questions have been answered. I also discussed with patient the reasons which may require a return visit and the importance of follow-up care. The patient is well-appearing, nontoxic, and improved at the time of discharge. Patient agrees to call to arrange follow-up care as directed. Patient understands to return immediately for worsening/change in symptoms. CRITICAL CARE TIME   Total Critical Care time was 15 minutes, excluding separately reportable procedures. There was a high probability of clinically significant/life threatening deterioration in the patient's condition which required my urgent intervention. This includes multiple reevaluations, vital sign monitoring, pulse oximetry monitoring, telemetry monitoring, clinical response to the IV medications, reviewing the nursing notes, consultation time, dictation/documentation time, and interpretation of the labwork. (This time excludes time spent performing procedures). CONSULTS:  None    PROCEDURES:  Unless otherwise noted below, none     Procedures    FINAL IMPRESSION      1. COVID-19    2. Chest wall pain    3.  Nausea vomiting and diarrhea          DISPOSITION/PLAN   DISPOSITION Decision To Discharge 12/20/2021 04:50:10 PM      PATIENT REFERREDTO:  Karson Montgomery MD  [address] [facility] [address] [phone]    Schedule an appointment as soon as possible for a visit         DISCHARGEMEDICATIONS:  New Prescriptions    ALBUTEROL SULFATE HFA (VENTOLIN HFA) 108 (90 BASE) MCG/ACT INHALER    Inhale 2 puffs into the lungs 4 times daily as needed for Wheezing    ONDANSETRON (ZOFRAN-ODT) 4 MG DISINTEGRATING TABLET    Take 1 tablet by mouth 3 times daily as needed for Nausea or Vomiting          (Please note that portions of this note were completed with a voice recognition program.  Efforts were made to edit the dictations but occasionally words are mis-transcribed.)    Fortino Zamora MD (electronically signed)  Attending Emergency Physician        Fortino Zamora MD  12/20/21 [id number]

## 2021-12-20 NOTE — ED TRIAGE NOTES
Patient to ed via car c/o chest pain x 5 days; mid sternal chest pain, SOB, palpitations   hx anxiety, no cardiac hx

## 2021-12-21 ENCOUNTER — CARE COORDINATION (OUTPATIENT)
Dept: CARE COORDINATION | Age: 31
End: 2021-12-21

## 2021-12-21 LAB
EKG ATRIAL RATE: 79 BPM
EKG DIAGNOSIS: NORMAL
EKG P AXIS: 64 DEGREES
EKG P-R INTERVAL: 122 MS
EKG Q-T INTERVAL: 384 MS
EKG QRS DURATION: 88 MS
EKG QTC CALCULATION (BAZETT): 440 MS
EKG R AXIS: -4 DEGREES
EKG T AXIS: 66 DEGREES
EKG VENTRICULAR RATE: 79 BPM

## 2021-12-21 PROCEDURE — 93010 ELECTROCARDIOGRAM REPORT: CPT | Performed by: INTERNAL MEDICINE

## 2021-12-21 NOTE — CARE COORDINATION
Patient contacted regarding COVID-19 diagnosis. Discussed COVID-19 related testing which was available at this time. Test results were positive. Patient informed of results, if available? Yes. Staying with his parents, in the basement. Left [facility] rehab 1 week ago. Believes symptoms started apx 1 week ago. Today, having some body aches and SOB. Hard to discern between anxiety related SOB. Denies fever/chills. States that he stopped his Wellbutrin a few days ago; encouraged him to resume the medication. Keeping food and fluids down. He is unvaccinated, but plans to get vaccinated. Discussed supportive care measures and quarantine timeline. Ambulatory Care Manager contacted the patient by telephone to perform post discharge assessment. Call within 2 business days of discharge: Yes. Verified name and  with patient as identifiers. Provided introduction to self, and explanation of the CTN/ACM role, and reason for call due to risk factors for infection and/or exposure to COVID-19. Symptoms reviewed with patient who verbalized the following symptoms: pain or aching joints, shortness of breath, no new symptoms and no worsening symptoms. Due to no new or worsening symptoms encounter was not routed to provider for escalation. Discussed follow-up appointments. If no appointment was previously scheduled, appointment scheduling offered: No.  [facility] follow up appointment(s): No future appointments. Non-[facility] follow up appointment(s): No    Non-face-to-face services provided:  Education of patient/family/caregiver/guardian to support self-management-supportive care measures  Assessment and support for treatment adherence and medication management-albuterol PRN, Zofran     Advance Care Planning:   Does patient have an Advance Directive:  not on file. Educated patient about risk for severe COVID-19 due to risk factors according to CDC guidelines.  ACM reviewed discharge instructions, medical action plan and red flag symptoms with the patient who verbalized understanding. Discussed COVID vaccination status: Yes and unvaccinated. Education provided on COVID-19 vaccination as appropriate. Discussed exposure protocols and quarantine with CDC Guidelines. Patient was given an opportunity to verbalize any questions and concerns and agrees to contact ACM or health care provider for questions related to their healthcare. Reviewed and educated patient on any new and changed medications related to discharge diagnosis     Was patient discharged with a pulse oximeter? No     ACM provided contact information. Plan for follow-up call in 3-5 days based on severity of symptoms and risk factors. No future appointments. Janina TANNER, RN  Ambulatory Care Manager  [phone]  [email or web address]

## 2021-12-23 ENCOUNTER — CARE COORDINATION (OUTPATIENT)
Dept: CARE COORDINATION | Age: 31
End: 2021-12-23

## 2021-12-23 NOTE — CARE COORDINATION
He answered, but stated that he was sleeping. Will call [facility] back later today. No future appointments. Nereida TANNER, RN  Ambulatory Care Manager  [phone]  [email or web address]

## 2021-12-23 NOTE — CARE COORDINATION
Patient contacted regarding COVID-19 diagnosis. Discussed COVID-19 related testing which was available at this time. Test results were positive. Patient informed of results, if available? Yes    He states that he is doing OK; hanging in there. Still having some feelings of anxiety that seem to be exacerbating his SOB. Resumed his medications. Went out for a walk to get some fresh air. Ambulatory Care Manager contacted the patient by telephone to perform follow-up assessment. Verified name and  with patient as identifiers. Patient has following risk factors of: no known risk factors. Symptoms reviewed with patient who verbalized the following symptoms: no new symptoms and no worsening symptoms. Due to no new or worsening symptoms encounter was not routed to provider for escalation. Educated patient about risk for severe COVID-19 due to risk factors according to CDC guidelines. ACM reviewed discharge instructions, medical action plan and red flag symptoms with the patient who verbalized understanding. Discussed COVID vaccination status: Yes and unvaccinated. Education provided on COVID-19 vaccination as appropriate. Discussed exposure protocols and quarantine with CDC Guidelines. Patient was given an opportunity to verbalize any questions and concerns and agrees to contact ACM or health care provider for questions related to their healthcare. Was patient discharged with a pulse oximeter? No     ACM provided contact information. Plan for follow-up call in 5-7 days based on severity of symptoms and risk factors. No future appointments. Bradley TANNER, RN  Ambulatory Care Manager  [phone]  [email or web address]

## 2021-12-28 ENCOUNTER — CARE COORDINATION (OUTPATIENT)
Dept: CARE COORDINATION | Age: 31
End: 2021-12-28

## 2021-12-28 NOTE — CARE COORDINATION
You Patient resolved from the Care Transitions episode on 12/28/21  Discussed COVID-19 related testing which was available at this time. Test results were positive. Patient informed of results, if available? Yes    Patient/family has been provided the following resources and education related to COVID-19:                         Signs, symptoms and red flags related to COVID-19            CDC exposure and quarantine guidelines            [employer] exposure contact - [phone]            Contact for their local Department of Health                 Patient currently reports that the following symptoms have improved:  no new/worsening symptoms     No further outreach scheduled with this CTN/ACM. Episode of Care resolved. Patient has this CTN/ACM contact information if future needs arise. No future appointments. Amina Burden MSN, RN  Ambulatory Care Manager  [phone]  [email or web address]

## 2022-01-12 PROBLEM — U07.1 COVID: Status: ACTIVE | Noted: 2022-01-12

## 2022-01-17 PROBLEM — R03.0 ELEVATED BLOOD PRESSURE READING: Status: ACTIVE | Noted: 2022-01-17

## 2022-01-17 PROBLEM — R07.2 PRECORDIAL PAIN: Status: ACTIVE | Noted: 2022-01-17

## 2023-10-22 ENCOUNTER — APPOINTMENT (OUTPATIENT)
Dept: CT IMAGING | Age: 33
End: 2023-10-22
Payer: MEDICAID

## 2023-10-22 ENCOUNTER — HOSPITAL ENCOUNTER (EMERGENCY)
Age: 33
Discharge: ANOTHER HOSPITAL | End: 2023-10-23
Attending: STUDENT IN AN ORGANIZED HEALTH CARE EDUCATION/TRAINING PROGRAM
Payer: MEDICAID

## 2023-10-22 DIAGNOSIS — T71.162A SUICIDE ATTEMPT BY HANGING, INITIAL ENCOUNTER (HCC): ICD-10-CM

## 2023-10-22 DIAGNOSIS — R45.851 SUICIDAL IDEATION: Primary | ICD-10-CM

## 2023-10-22 LAB
ALBUMIN SERPL-MCNC: 4.2 G/DL (ref 3.4–5)
ALBUMIN/GLOB SERPL: 1.6 {RATIO} (ref 1.1–2.2)
ALP SERPL-CCNC: 78 U/L (ref 40–129)
ALT SERPL-CCNC: 30 U/L (ref 10–40)
AMPHETAMINES UR QL SCN>1000 NG/ML: NORMAL
ANION GAP SERPL CALCULATED.3IONS-SCNC: 12 MMOL/L (ref 3–16)
APAP SERPL-MCNC: <5 UG/ML (ref 10–30)
AST SERPL-CCNC: 22 U/L (ref 15–37)
BARBITURATES UR QL SCN>200 NG/ML: NORMAL
BASOPHILS # BLD: 0.1 K/UL (ref 0–0.2)
BASOPHILS NFR BLD: 0.9 %
BENZODIAZ UR QL SCN>200 NG/ML: NORMAL
BILIRUB SERPL-MCNC: <0.2 MG/DL (ref 0–1)
BUN SERPL-MCNC: 9 MG/DL (ref 7–20)
CALCIUM SERPL-MCNC: 9.1 MG/DL (ref 8.3–10.6)
CANNABINOIDS UR QL SCN>50 NG/ML: NORMAL
CHLORIDE SERPL-SCNC: 102 MMOL/L (ref 99–110)
CO2 SERPL-SCNC: 25 MMOL/L (ref 21–32)
COCAINE UR QL SCN: NORMAL
CREAT SERPL-MCNC: 1 MG/DL (ref 0.9–1.3)
DEPRECATED RDW RBC AUTO: 13.7 % (ref 12.4–15.4)
DRUG SCREEN COMMENT UR-IMP: NORMAL
EOSINOPHIL # BLD: 0.3 K/UL (ref 0–0.6)
EOSINOPHIL NFR BLD: 4 %
ETHANOLAMINE SERPL-MCNC: 43 MG/DL (ref 0–0.08)
FENTANYL SCREEN, URINE: NORMAL
GFR SERPLBLD CREATININE-BSD FMLA CKD-EPI: >60 ML/MIN/{1.73_M2}
GLUCOSE SERPL-MCNC: 91 MG/DL (ref 70–99)
HCT VFR BLD AUTO: 45.9 % (ref 40.5–52.5)
HGB BLD-MCNC: 15.6 G/DL (ref 13.5–17.5)
LYMPHOCYTES # BLD: 2.8 K/UL (ref 1–5.1)
LYMPHOCYTES NFR BLD: 34.3 %
MCH RBC QN AUTO: 30.2 PG (ref 26–34)
MCHC RBC AUTO-ENTMCNC: 33.9 G/DL (ref 31–36)
MCV RBC AUTO: 89.2 FL (ref 80–100)
METHADONE UR QL SCN>300 NG/ML: NORMAL
MONOCYTES # BLD: 1 K/UL (ref 0–1.3)
MONOCYTES NFR BLD: 12.3 %
NEUTROPHILS # BLD: 4 K/UL (ref 1.7–7.7)
NEUTROPHILS NFR BLD: 48.5 %
OPIATES UR QL SCN>300 NG/ML: NORMAL
OXYCODONE UR QL SCN: NORMAL
PCP UR QL SCN>25 NG/ML: NORMAL
PH UR STRIP: 5 [PH]
PLATELET # BLD AUTO: 218 K/UL (ref 135–450)
PMV BLD AUTO: 8.5 FL (ref 5–10.5)
POTASSIUM SERPL-SCNC: 4.2 MMOL/L (ref 3.5–5.1)
PROT SERPL-MCNC: 6.9 G/DL (ref 6.4–8.2)
RBC # BLD AUTO: 5.14 M/UL (ref 4.2–5.9)
SALICYLATES SERPL-MCNC: <0.3 MG/DL (ref 15–30)
SARS-COV-2 RDRP RESP QL NAA+PROBE: NOT DETECTED
SODIUM SERPL-SCNC: 139 MMOL/L (ref 136–145)
WBC # BLD AUTO: 8.2 K/UL (ref 4–11)

## 2023-10-22 PROCEDURE — 87635 SARS-COV-2 COVID-19 AMP PRB: CPT

## 2023-10-22 PROCEDURE — 6360000004 HC RX CONTRAST MEDICATION: Performed by: STUDENT IN AN ORGANIZED HEALTH CARE EDUCATION/TRAINING PROGRAM

## 2023-10-22 PROCEDURE — 80179 DRUG ASSAY SALICYLATE: CPT

## 2023-10-22 PROCEDURE — 80307 DRUG TEST PRSMV CHEM ANLYZR: CPT

## 2023-10-22 PROCEDURE — 36415 COLL VENOUS BLD VENIPUNCTURE: CPT

## 2023-10-22 PROCEDURE — 80053 COMPREHEN METABOLIC PANEL: CPT

## 2023-10-22 PROCEDURE — 70498 CT ANGIOGRAPHY NECK: CPT

## 2023-10-22 PROCEDURE — 72125 CT NECK SPINE W/O DYE: CPT

## 2023-10-22 PROCEDURE — 82077 ASSAY SPEC XCP UR&BREATH IA: CPT

## 2023-10-22 PROCEDURE — 99285 EMERGENCY DEPT VISIT HI MDM: CPT

## 2023-10-22 PROCEDURE — 93005 ELECTROCARDIOGRAM TRACING: CPT | Performed by: STUDENT IN AN ORGANIZED HEALTH CARE EDUCATION/TRAINING PROGRAM

## 2023-10-22 PROCEDURE — 80143 DRUG ASSAY ACETAMINOPHEN: CPT

## 2023-10-22 PROCEDURE — 85025 COMPLETE CBC W/AUTO DIFF WBC: CPT

## 2023-10-22 RX ADMIN — IOPAMIDOL 75 ML: 755 INJECTION, SOLUTION INTRAVENOUS at 22:16

## 2023-10-22 ASSESSMENT — PAIN - FUNCTIONAL ASSESSMENT: PAIN_FUNCTIONAL_ASSESSMENT: NONE - DENIES PAIN

## 2023-10-22 ASSESSMENT — PAIN SCALES - GENERAL: PAINLEVEL_OUTOF10: 0

## 2023-10-23 ENCOUNTER — HOSPITAL ENCOUNTER (INPATIENT)
Age: 33
LOS: 2 days | Discharge: HOME | DRG: 751 | End: 2023-10-25
Attending: PSYCHIATRY & NEUROLOGY | Admitting: PSYCHIATRY & NEUROLOGY
Payer: MEDICAID

## 2023-10-23 VITALS
BODY MASS INDEX: 27.99 KG/M2 | WEIGHT: 195.5 LBS | TEMPERATURE: 98.3 F | OXYGEN SATURATION: 97 % | RESPIRATION RATE: 16 BRPM | DIASTOLIC BLOOD PRESSURE: 72 MMHG | HEIGHT: 70 IN | SYSTOLIC BLOOD PRESSURE: 109 MMHG | HEART RATE: 63 BPM

## 2023-10-23 PROBLEM — T14.91XA SUICIDE ATTEMPT (HCC): Status: ACTIVE | Noted: 2023-10-23

## 2023-10-23 PROBLEM — F10.10 ALCOHOL ABUSE: Status: ACTIVE | Noted: 2023-10-23

## 2023-10-23 PROBLEM — F33.2 SEVERE EPISODE OF RECURRENT MAJOR DEPRESSIVE DISORDER, WITHOUT PSYCHOTIC FEATURES (HCC): Chronic | Status: ACTIVE | Noted: 2023-10-23

## 2023-10-23 PROBLEM — F15.10 METHAMPHETAMINE ABUSE (HCC): Status: ACTIVE | Noted: 2023-10-23

## 2023-10-23 PROBLEM — F32.2 CURRENT SEVERE EPISODE OF MAJOR DEPRESSIVE DISORDER WITHOUT PSYCHOTIC FEATURES (HCC): Status: ACTIVE | Noted: 2023-10-23

## 2023-10-23 PROBLEM — F11.10 HEROIN ABUSE (HCC): Status: ACTIVE | Noted: 2023-10-23

## 2023-10-23 PROBLEM — F19.11 H/O: SUBSTANCE ABUSE (HCC): Status: ACTIVE | Noted: 2021-06-08

## 2023-10-23 LAB
EKG ATRIAL RATE: 77 BPM
EKG DIAGNOSIS: NORMAL
EKG P AXIS: 46 DEGREES
EKG P-R INTERVAL: 120 MS
EKG Q-T INTERVAL: 374 MS
EKG QRS DURATION: 90 MS
EKG QTC CALCULATION (BAZETT): 423 MS
EKG R AXIS: 51 DEGREES
EKG T AXIS: 40 DEGREES
EKG VENTRICULAR RATE: 77 BPM

## 2023-10-23 PROCEDURE — 99221 1ST HOSP IP/OBS SF/LOW 40: CPT | Performed by: NURSE PRACTITIONER

## 2023-10-23 PROCEDURE — 6370000000 HC RX 637 (ALT 250 FOR IP): Performed by: NURSE PRACTITIONER

## 2023-10-23 PROCEDURE — 6370000000 HC RX 637 (ALT 250 FOR IP): Performed by: PSYCHIATRY & NEUROLOGY

## 2023-10-23 PROCEDURE — 99223 1ST HOSP IP/OBS HIGH 75: CPT | Performed by: PSYCHIATRY & NEUROLOGY

## 2023-10-23 PROCEDURE — 1240000000 HC EMOTIONAL WELLNESS R&B

## 2023-10-23 RX ORDER — ACETAMINOPHEN 325 MG/1
650 TABLET ORAL EVERY 8 HOURS PRN
Status: DISCONTINUED | OUTPATIENT
Start: 2023-10-23 | End: 2023-10-25 | Stop reason: HOSPADM

## 2023-10-23 RX ORDER — BUPROPION HYDROCHLORIDE 150 MG/1
1 TABLET ORAL
Status: ON HOLD | COMMUNITY
Start: 2023-10-09 | End: 2023-10-24 | Stop reason: HOSPADM

## 2023-10-23 RX ORDER — TRAZODONE HYDROCHLORIDE 50 MG/1
50 TABLET ORAL NIGHTLY PRN
Status: DISCONTINUED | OUTPATIENT
Start: 2023-10-23 | End: 2023-10-25 | Stop reason: HOSPADM

## 2023-10-23 RX ORDER — AMITRIPTYLINE HYDROCHLORIDE 50 MG/1
TABLET, FILM COATED ORAL
Status: ON HOLD | COMMUNITY
Start: 2023-09-27 | End: 2023-10-24 | Stop reason: HOSPADM

## 2023-10-23 RX ORDER — OLANZAPINE 10 MG/1
10 TABLET ORAL EVERY 8 HOURS PRN
Status: DISCONTINUED | OUTPATIENT
Start: 2023-10-23 | End: 2023-10-25 | Stop reason: HOSPADM

## 2023-10-23 RX ORDER — MAGNESIUM HYDROXIDE/ALUMINUM HYDROXICE/SIMETHICONE 120; 1200; 1200 MG/30ML; MG/30ML; MG/30ML
30 SUSPENSION ORAL EVERY 6 HOURS PRN
Status: DISCONTINUED | OUTPATIENT
Start: 2023-10-23 | End: 2023-10-25 | Stop reason: HOSPADM

## 2023-10-23 RX ORDER — HALOPERIDOL 10 MG/1
1 TABLET ORAL
Status: ON HOLD | COMMUNITY
Start: 2023-10-09 | End: 2023-10-24 | Stop reason: HOSPADM

## 2023-10-23 RX ORDER — OLANZAPINE 10 MG/1
10 INJECTION, POWDER, LYOPHILIZED, FOR SOLUTION INTRAMUSCULAR EVERY 8 HOURS PRN
Status: DISCONTINUED | OUTPATIENT
Start: 2023-10-23 | End: 2023-10-25 | Stop reason: HOSPADM

## 2023-10-23 RX ORDER — NICOTINE 21 MG/24HR
1 PATCH, TRANSDERMAL 24 HOURS TRANSDERMAL DAILY
Status: DISCONTINUED | OUTPATIENT
Start: 2023-10-23 | End: 2023-10-25 | Stop reason: HOSPADM

## 2023-10-23 RX ORDER — VENLAFAXINE HYDROCHLORIDE 75 MG/1
75 CAPSULE, EXTENDED RELEASE ORAL
Status: DISCONTINUED | OUTPATIENT
Start: 2023-10-23 | End: 2023-10-25 | Stop reason: HOSPADM

## 2023-10-23 RX ORDER — ESCITALOPRAM OXALATE 10 MG/1
10 TABLET ORAL DAILY
Status: ON HOLD | COMMUNITY
Start: 2022-10-07 | End: 2023-10-24 | Stop reason: HOSPADM

## 2023-10-23 RX ORDER — HYDROXYZINE 50 MG/1
50 TABLET, FILM COATED ORAL 3 TIMES DAILY PRN
Status: DISCONTINUED | OUTPATIENT
Start: 2023-10-23 | End: 2023-10-25 | Stop reason: HOSPADM

## 2023-10-23 RX ORDER — ATORVASTATIN CALCIUM 10 MG/1
TABLET, FILM COATED ORAL
Status: ON HOLD | COMMUNITY
Start: 2023-08-25 | End: 2023-10-24 | Stop reason: HOSPADM

## 2023-10-23 RX ORDER — POLYETHYLENE GLYCOL 3350 17 G
2 POWDER IN PACKET (EA) ORAL
Status: DISCONTINUED | OUTPATIENT
Start: 2023-10-23 | End: 2023-10-25 | Stop reason: HOSPADM

## 2023-10-23 RX ADMIN — OLANZAPINE 10 MG: 10 TABLET, FILM COATED ORAL at 14:48

## 2023-10-23 RX ADMIN — HYDROXYZINE HYDROCHLORIDE 50 MG: 50 TABLET, FILM COATED ORAL at 09:43

## 2023-10-23 ASSESSMENT — PATIENT HEALTH QUESTIONNAIRE - PHQ9
8. MOVING OR SPEAKING SO SLOWLY THAT OTHER PEOPLE COULD HAVE NOTICED. OR THE OPPOSITE, BEING SO FIGETY OR RESTLESS THAT YOU HAVE BEEN MOVING AROUND A LOT MORE THAN USUAL: 3
7. TROUBLE CONCENTRATING ON THINGS, SUCH AS READING THE NEWSPAPER OR WATCHING TELEVISION: 3
3. TROUBLE FALLING OR STAYING ASLEEP: 3
SUM OF ALL RESPONSES TO PHQ QUESTIONS 1-9: 27
4. FEELING TIRED OR HAVING LITTLE ENERGY: 3
9. THOUGHTS THAT YOU WOULD BE BETTER OFF DEAD, OR OF HURTING YOURSELF: 3
9. THOUGHTS THAT YOU WOULD BE BETTER OFF DEAD, OR OF HURTING YOURSELF: 3
SUM OF ALL RESPONSES TO PHQ QUESTIONS 1-9: 27
6. FEELING BAD ABOUT YOURSELF - OR THAT YOU ARE A FAILURE OR HAVE LET YOURSELF OR YOUR FAMILY DOWN: 3
10. IF YOU CHECKED OFF ANY PROBLEMS, HOW DIFFICULT HAVE THESE PROBLEMS MADE IT FOR YOU TO DO YOUR WORK, TAKE CARE OF THINGS AT HOME, OR GET ALONG WITH OTHER PEOPLE: 3
SUM OF ALL RESPONSES TO PHQ QUESTIONS 1-9: 24
6. FEELING BAD ABOUT YOURSELF - OR THAT YOU ARE A FAILURE OR HAVE LET YOURSELF OR YOUR FAMILY DOWN: 3
SUM OF ALL RESPONSES TO PHQ QUESTIONS 1-9: 27
SUM OF ALL RESPONSES TO PHQ9 QUESTIONS 1 & 2: 6
SUM OF ALL RESPONSES TO PHQ9 QUESTIONS 1 & 2: 6
SUM OF ALL RESPONSES TO PHQ QUESTIONS 1-9: 27
1. LITTLE INTEREST OR PLEASURE IN DOING THINGS: 3
4. FEELING TIRED OR HAVING LITTLE ENERGY: 3
5. POOR APPETITE OR OVEREATING: 3
3. TROUBLE FALLING OR STAYING ASLEEP: 3
2. FEELING DOWN, DEPRESSED OR HOPELESS: 3
1. LITTLE INTEREST OR PLEASURE IN DOING THINGS: 3
5. POOR APPETITE OR OVEREATING: 3
10. IF YOU CHECKED OFF ANY PROBLEMS, HOW DIFFICULT HAVE THESE PROBLEMS MADE IT FOR YOU TO DO YOUR WORK, TAKE CARE OF THINGS AT HOME, OR GET ALONG WITH OTHER PEOPLE: 3
7. TROUBLE CONCENTRATING ON THINGS, SUCH AS READING THE NEWSPAPER OR WATCHING TELEVISION: 3
SUM OF ALL RESPONSES TO PHQ QUESTIONS 1-9: 24
SUM OF ALL RESPONSES TO PHQ QUESTIONS 1-9: 27
2. FEELING DOWN, DEPRESSED OR HOPELESS: 3
SUM OF ALL RESPONSES TO PHQ QUESTIONS 1-9: 27
8. MOVING OR SPEAKING SO SLOWLY THAT OTHER PEOPLE COULD HAVE NOTICED. OR THE OPPOSITE, BEING SO FIGETY OR RESTLESS THAT YOU HAVE BEEN MOVING AROUND A LOT MORE THAN USUAL: 3

## 2023-10-23 ASSESSMENT — SLEEP AND FATIGUE QUESTIONNAIRES
DO YOU USE A SLEEP AID: NO
DO YOU HAVE DIFFICULTY SLEEPING: YES
SLEEP PATTERN: DIFFICULTY FALLING ASLEEP;DISTURBED/INTERRUPTED SLEEP;RESTLESSNESS;NIGHTMARES/TERRORS
AVERAGE NUMBER OF SLEEP HOURS: 6

## 2023-10-23 ASSESSMENT — LIFESTYLE VARIABLES
HOW MANY STANDARD DRINKS CONTAINING ALCOHOL DO YOU HAVE ON A TYPICAL DAY: 3 OR 4
HOW OFTEN DO YOU HAVE A DRINK CONTAINING ALCOHOL: 2-3 TIMES A WEEK

## 2023-10-23 NOTE — BH NOTE
Pt requesting medication for agitation, when asked what was causing his symptoms he stated, \"I'm just super suicidal. I want to end it. But theres no way to do that here. \" Pt states he will come to staff if his thoughts worsen. PRN hydroxyzine given per order.

## 2023-10-23 NOTE — BH NOTE
CSSR-S Assessment completed with patient who then scored HIGH RISK. Provider Dr. Faith Preston was notified of HIGH RISK score.     Is the patient currently expressing suicidal ideation: NO    Is the patient willing to notify staff of suicidal ideation: YES    Completed by: Yasmin Wright RN

## 2023-10-23 NOTE — PROGRESS NOTES
Behavioral Services  Medicare Certification Upon Admission    I certify that this patient's inpatient psychiatric hospital admission is medically necessary for:    [x] (1) Treatment which could reasonably be expected to improve this patient's condition,       [x] (2) Or for diagnostic study;     AND     [x](2) The inpatient psychiatric services are provided while the individual is under the care of a physician and are included in the individualized plan of care.     Estimated length of stay/service 5 d    Plan for post-hospital care outpt    Electronically signed by Ezio Mcdaniel MD on 10/23/2023 at 2:15 PM

## 2023-10-23 NOTE — H&P
carotid arteries: No significant atherosclerotic calcifications of the  carotid bifurcations. Right cervical internal carotid artery: No significant stenosis of the proximal  right cervical internal carotid by NASCET criteria. Right external carotid artery: Patent  Left cervical internal carotid artery: No significant stenosis of the proximal  left cervical internal carotid by NASCET criteria. Left external carotid artery: Patent     Intracranial internal carotid arteries: Normal  Anterior circulation: M1, A1, and their more distal segments are normal.     Cervical vertebral arteries: Normal  Intracranial vertebral arteries: The left vertebral artery terminates as a  cerebellar branch. The right vertebral artery is dominant, fully supplying the  basilar artery. Basilar artery: Normal  PICA, AICA, superior cerebellar arteries: Normal  Posterior circulation: Normal.     Dural venous sinuses: Normal     Contrast enhanced brain parenchyma: There is a prominent vascular structure in  the right cerebellum with a large draining vein and stellate appearance of the  small vessels leading to it (series 6 image 69). Favored to represent a  developmental venous anomaly. Orbits: Normal  Paranasal sinuses and mastoid air cells: Normal  Neck soft tissues: No significant neck hematoma. No lymphadenopathy. Thyroid  gland unremarkable. . Submandibular glands and parotid glands unremarkable. Osseous structures: No suspicious osseous lesions. Lung apices: Clear     IMPRESSION:     CTA Head:  No flow-limiting stenosis or large vessel occlusion. Incidental finding: Right cerebellar developmental venous anomaly. CTA Neck:  No flow-limiting stenosis or dissection.          ASSESSMENT/PLAN:    SI with attempt  - evaluated in ED +neck pain while in ED, CT as above  - currently denies any pain or discomfort   - per psychiatry team    HTN hx  - pt is currently not on medication  - monitor blood pressure     Polysubstance Abuse Hx  -
Date Noted    Suicide attempt ([address]) 10/23/2023    Current severe episode of major depressive disorder without psychotic features ([address]) 10/23/2023    Alcohol abuse 10/23/2023    Heroin abuse ([address]) 10/23/2023    Methamphetamine abuse ([address]) 10/23/2023    Elevated blood pressure reading 01/17/2022    Precordial pain 01/17/2022    COVID 01/12/2022    H/O: substance abuse ([address]) 06/08/2021    Sprain of wrist, right 02/13/2017    Neck pain 01/27/2017    DDD (degenerative disc disease), cervical 01/27/2017    Hepatitis C virus infection without hepatic coma 07/26/2016    Anxiety 08/01/2012    ADHD (attention deficit hyperactivity disorder)     Rosacea     And Present on Admission:   Hepatitis C virus infection without hepatic coma   H/O: substance abuse ([address])   Suicide attempt ([address])   Current severe episode of major depressive disorder without psychotic features ([address])   Alcohol abuse   Heroin abuse ([address])   Methamphetamine abuse ([address])    [place] 4: Housing problems and Problems related to interaction with the legal system/ crime    Axis 5: 41-50 serious symptoms   All conditions on Axis 1 and Axis 2 and active Axis 3 problems are being treated while patient is hospitalized. Active Hospital Problems    Diagnosis Date Noted    Suicide attempt [facility]) [place] 10/23/2023    Current severe episode of major depressive disorder without psychotic features ([address]) [F32.2] 10/23/2023    Alcohol abuse [F10.10] 10/23/2023    Heroin abuse ([address]) [F11.10] 10/23/2023    Methamphetamine abuse ([address]) [F15.10] 10/23/2023    H/O: substance abuse ([address]) [F19.11] 06/08/2021    Hepatitis C virus infection without hepatic coma [B19.20] 07/26/2016     Tx plan:  prevent self injury, stabilize affect, restore sleep, treat depression, treat anxiety, establish/maintain aftercare, increase coping mechanisms, improve medication compliance. All conditions present on admission are being treated while pt is hospitalized.    Discussed PHP after discharge as part

## 2023-10-23 NOTE — ED PROVIDER NOTES
head CT was warranted. Screening laboratory studies were obtained. CBC with no leukocytosis, making systemic infection somewhat less likely. There is no significant new anemia. Basic metabolic panel without evidence of acute kidney injury or significant electrolyte derangement. Sars covid negative  Ethanol 43, clinically sober  Acetaminophen and salicylate levels negative  Hepatic function panel unremarkable making acute hepatitis or biliary pathology less likely. EKG wihtout evidence of toxic effects. CTA H&N reassuring against any injury from suspension injury  CT C-spine without fracture or dislocation    Patient appears to be at high risk for self-harm. Given statements and risk factors, will sign a statement of belief and seek psychiatric consultation not available here. At this time the patient is medically cleared for further evaluation by psychiatry      Medical Decision Making  Amount and/or Complexity of Data Reviewed  External Data Reviewed: notes. Details: external psychiatry notes  Labs: ordered. Decision-making details documented in ED Course. Radiology: ordered. Decision-making details documented in ED Course. ECG/medicine tests: ordered and independent interpretation performed. Decision-making details documented in ED Course. Risk  Prescription drug management. Clinical Impression     1. Suicidal ideation    2. Suicide attempt by hanging, initial encounter [facility])        Disposition     PATIENT REFERRED TO:  No follow-up provider specified. DISCHARGE MEDICATIONS:  New Prescriptions    No medications on file       DISPOSITION Decision To Transfer 10/23/2023 12:04:02 AM        Diagnostic Results and Other Data       RADIOLOGY:  CT CERVICAL SPINE WO CONTRAST   Final Result      No acute osseous abnormality of the cervical spine. CTA HEAD NECK W CONTRAST   Final Result      CTA Head:   No flow-limiting stenosis or large vessel occlusion.

## 2023-10-23 NOTE — CARE COORDINATION
Psychosocial, PTSD screen, OQ, and CSSR - S completed by CHARLES Luo     Hx of inpatient admissions for SI. Last admission was a month ago in [facility]. [facility] treatment [facility]. Pt says he is not motivated for [facility] Treatment and then feels defeated. Patient has been on Haldol and Welbutrin for about 3 weeks but doesn't feel like they are helping. Was on Zyprexa in the past and found it helpful but reports unwanted weight gain as a side effect. Patient was preoccupied with thoughts of SI. Stated during assessment \"I wrapped a silk tie around my neck and tried to hang myself on the door. I wish I had finished the job\". Patient endorsed feeling hopeless and states he doesn't see the point in living. States he would end life if had the means to do so, no specific plan in mind. Reports hx of SI since age 13, endorsed drinking anti-freeze 13 years ago in attempt to end life. Pts nurse and attending doctor informed of SI statements. Pt denies trauma or abuse hx. Endorses substance use hx of alcohol, meth, and heroin. Pt was preoccupied with concern over where he will go after leaving the hospital. He is uncertain if he wants to continue treatment. 10/23/23 1114   Psychiatric History   Psychiatric history treatment Psychiatric admissions  (Hx of inpatient admissions for SI. Last admission was a month ago in [facility]. [facility] treatment [employer]. Patient is on Haldol and Welbutrin for about 3 weeks but doesn't feel like they are helping.)   Are there any medication issues? Yes  (Cannot have seroquel, difficulties breathing.)   Recent Psychological Experiences Other(comment)  (Endorses SI. Pt reports thoughts have been getting worse since leaving [facility]. States he put a silk tie around neck and attempted to suffocate self to end life.  Pt states \"I wish I would have done it now, I have no where to go\")   Support System   Support system None   Problems in support

## 2023-10-23 NOTE — BH NOTE
[facility]  Admission Note     Admission Type:   Admission Type: Voluntary    Reason for admission:  Reason for Admission: Suicidal ideation      Addictive Behavior:   Addictive Behavior  In the Past 3 Months, Have You Felt or Has Someone Told You That You Have a Problem With  : None    Medical Problems:   Past Medical History:   Diagnosis Date    Asthma     Hepatitis C     Hyperlipidemia        Status EXAM:  Mental Status and Behavioral Exam  Normal: No  Level of Assistance: Independent/Self  Facial Expression: Flat  Affect: Constricted  Level of Consciousness: Alert  Frequency of Checks: 4 times per hour, close  Mood:Normal: No  Mood: Helpless, Depressed  Motor Activity:Normal: No  Motor Activity: Decreased  Eye Contact: Fair  Observed Behavior: Cooperative, Withdrawn  Sexual Misconduct History: Current - no  Preception: [place] to person, [place] to time, [place] to place, [place] to situation  Attention:Normal: Yes  Thought Processes: Unremarkable  Thought Content:Normal: Yes  Thought Content: Preoccupations (Preoccupied with SI)  Depression Symptoms: Isolative, Loss of interest, Feelings of helplessness, Feelings of hopelessess, Appetite change, Sleep disturbance  Anxiety Symptoms: Generalized  Madeline Symptoms: No problems reported or observed.   Hallucinations: None  Delusions: No  Memory:Normal: Yes  Insight and Judgment: No  Insight and Judgment: Poor judgment, Poor insight    Tobacco Screening:  Practical Counseling, on admission, shadi X, if applicable and completed (first 3 are required if patient doesn't refuse):            ( ) Recognizing danger situations (included triggers and roadblocks)                    ( ) Coping skills (new ways to manage stress,relaxation techniques, changing routine, distraction)                                                           ( ) Basic information about quitting (benefits of quitting, techniques in how to quit, available resources  ( ) Referral for

## 2023-10-23 NOTE — ED TRIAGE NOTES
Pt states he has been suicidal for many years, recently he has been thinking more seriously about attempting over the past few days. Pt has been living [facility] for the past 4-5 days, attempted suicide last night by trying to hang self with neck tie. Pt states he wants to get help or he wants to die.  Pt states if he is discharged he would try to hang himself again, admits to suicide attempt 15 years ago by drinking anti-freeze

## 2023-10-23 NOTE — VIRTUAL HEALTH
Reason for Cancel: TelePsych Reason for Cancel: Patient already admitted       The patient was located at Facility (Appt Department): [address] [address]  Loc: [phone]  The provider was located at Home (City/State): [address] [place] [address] to Tele-Psych  Consult performed by: Gaynel Castleman, LCSW  Consult ordered by: An Urbina MD           Total time spent on this encounter: Not billed by time    --Gaynel Castleman, LCSW on 10/23/2023 at 2:07 AM    An electronic signature was used to authenticate this note.

## 2023-10-23 NOTE — BH NOTE
Pt arrived to unit in stable condition from [facility]. Oriented to room and unit. Pt denies current needs.

## 2023-10-24 PROBLEM — F32.2 CURRENT SEVERE EPISODE OF MAJOR DEPRESSIVE DISORDER WITHOUT PSYCHOTIC FEATURES (HCC): Status: ACTIVE | Noted: 2023-10-24

## 2023-10-24 PROCEDURE — 99233 SBSQ HOSP IP/OBS HIGH 50: CPT | Performed by: PSYCHIATRY & NEUROLOGY

## 2023-10-24 PROCEDURE — 6370000000 HC RX 637 (ALT 250 FOR IP): Performed by: NURSE PRACTITIONER

## 2023-10-24 PROCEDURE — 6370000000 HC RX 637 (ALT 250 FOR IP): Performed by: PSYCHIATRY & NEUROLOGY

## 2023-10-24 PROCEDURE — 1240000000 HC EMOTIONAL WELLNESS R&B

## 2023-10-24 RX ORDER — VENLAFAXINE HYDROCHLORIDE 75 MG/1
75 CAPSULE, EXTENDED RELEASE ORAL
Qty: 30 CAPSULE | Refills: 0 | Status: SHIPPED | OUTPATIENT
Start: 2023-10-25

## 2023-10-24 RX ORDER — ATOMOXETINE 60 MG/1
60 CAPSULE ORAL DAILY
Qty: 30 CAPSULE | Refills: 0 | Status: SHIPPED | OUTPATIENT
Start: 2023-10-24

## 2023-10-24 RX ADMIN — VENLAFAXINE HYDROCHLORIDE 75 MG: 75 CAPSULE, EXTENDED RELEASE ORAL at 09:20

## 2023-10-24 RX ADMIN — NICOTINE POLACRILEX 2 MG: 2 LOZENGE ORAL at 17:45

## 2023-10-24 RX ADMIN — NICOTINE POLACRILEX 2 MG: 2 LOZENGE ORAL at 16:00

## 2023-10-24 RX ADMIN — TRAZODONE HYDROCHLORIDE 50 MG: 50 TABLET ORAL at 21:40

## 2023-10-24 RX ADMIN — NICOTINE POLACRILEX 2 MG: 2 LOZENGE ORAL at 13:44

## 2023-10-24 RX ADMIN — ATOMOXETINE 60 MG: 40 CAPSULE ORAL at 12:36

## 2023-10-24 RX ADMIN — NICOTINE POLACRILEX 2 MG: 2 LOZENGE ORAL at 20:13

## 2023-10-24 ASSESSMENT — PAIN SCALES - GENERAL: PAINLEVEL_OUTOF10: 0

## 2023-10-24 NOTE — DISCHARGE INSTRUCTIONS
Advanced Directives:  Patient does not have a surrogate decision maker appointed   Name (if yes): N/A Phone Number: N/A  Patient does not have a psychiatric and/ or medical advanced directive or power of . Patient was offered psychiatric and/ or medical advanced directive or power of  information/completion but declined to complete   Why not? N/A    Discharge Planning is Complete. Discharge Date: 10/25/23  Reason for Hospitalization: Patient is a 26 y.o. male who presents with suicidal ideation. He states that he was having suicidal thoughts and tried to hang himself by tying a silk tie around a door handle but realized it was not going to work so he stopped. Discharge Diagnosis: Severe episode of recurrent major depressive disorder, without psychotic features ([address])  Discharging to: Home    Your instructions: Your physician here was Riley Simpson MD. If you have any questions please call the unit at [phone] for the adult unit or [phone] for [facility]. Please note that we have a patient family advisory [place] that meets the second Wednesday of January and the second Wednesday of July at [address] [place] [address] in [place] at [facility]. Department leadership would love for you to attend to give feedback on what we are doing well and areas in which we can improve our patient care. Please come if you are able and feel free to reach out to [facility] at [phone] with any questions. The crisis number for [facility] is [address] [facility] [address] can use this number at any time to access emergency mental health services. Please follow up with your PCP regarding any pending labs. You have been accepted into [facility] for Inpatient Substance Abuse Treatment.    Name of Provider: [facility]  Provider specialty/license: Inpatient Substance Abuse Treatment    Date and time of appointment: 10/25/23-

## 2023-10-24 NOTE — BH NOTE
Pt has been sleeping all evening and remains asleep at this time. Pt appears to be sleeping well. Respirations easy and even. Pt not restless and no distress noted.

## 2023-10-24 NOTE — GROUP NOTE
Group Therapy Note    Date: 10/24/2023    Group Start Time: 1300  Group End Time: 9307  Group Topic: [facility], MSW, [facility]        Group Therapy Note    Attendees: 3    SW used active listening to engage in conversation while checking in with the group. SW worked to provide education on the skill of gratitude and its correlation to an elevated sense of happiness. The group then worked to complete a gratitude exercise by sharing and discussing people, places, and things they are grateful for. Notes:  Pt was present and alert for the group. The Pt completed a check in on their thoughts and feelings. The Pt then worked with the provider and peers to share and discuss people, places, and things they are grateful for. Status After Intervention:  Improved    Participation Level:  Active Listener and Interactive    Participation Quality: Appropriate, Attentive, and Sharing      Speech:  normal      Thought Process/Content: Logical      Affective Functioning: Congruent      Mood: anxious      Level of consciousness:  Alert      Response to Learning: Able to verbalize current knowledge/experience and Able to verbalize/acknowledge new learning      Endings: None Reported    Modes of Intervention: Education, Support, and Socialization      Discipline Responsible: /Counselor      Signature:  CHARLES Rivera, [place]

## 2023-10-24 NOTE — GROUP NOTE
Group Therapy Note    Date: 10/24/2023    Group Start Time: 2015  Group End Time: 2040  Group Topic: Wrap-Up    [address] [place] [address]    Sundar Zazueta RN        Group Therapy Note    Attendees: 7       Patient's Goal:  none      Notes:  Pt did not attend group    Status After Intervention:  Unchanged    Participation Level: None    Participation Quality: Appropriate      Speech:  PT DID NOT ATTEND THIS GROUP. HE WAS PUT IN FOR GROUP IN ERROR. Thought Process/Content:( Logical)  NOT IN GROUP. PT SLEEPING. Affective Functioning: (Flat)  PT SLEEPING. . NOT IN GROUP      Mood:  PT. NOT IN GROUP. Level of consciousness:  Drowsy      Response to Learning:( Able to retain information)      Endings: None Reported  PT DID NOT ATTEND GROUP    Modes of Intervention: (Education)   PT SLEEPING.  DID NOT ATTEND      Discipline Responsible: Registered Nurse      Signature:  Sundar Zazueta RN

## 2023-10-24 NOTE — PROGRESS NOTES
CLINICAL PHARMACY NOTE: MEDS TO BEDS    Total # of Prescriptions Filled: 2   The following medications were delivered to the patient:  Atomoxetine 60mg #30 1QD  Venlafaxine 75mg #30 1QD    Additional Documentation:

## 2023-10-25 VITALS
HEART RATE: 98 BPM | HEIGHT: 69 IN | RESPIRATION RATE: 16 BRPM | BODY MASS INDEX: 28.88 KG/M2 | TEMPERATURE: 98.2 F | DIASTOLIC BLOOD PRESSURE: 80 MMHG | WEIGHT: 195 LBS | OXYGEN SATURATION: 97 % | SYSTOLIC BLOOD PRESSURE: 123 MMHG

## 2023-10-25 PROCEDURE — 6370000000 HC RX 637 (ALT 250 FOR IP): Performed by: PSYCHIATRY & NEUROLOGY

## 2023-10-25 PROCEDURE — 6370000000 HC RX 637 (ALT 250 FOR IP): Performed by: NURSE PRACTITIONER

## 2023-10-25 PROCEDURE — 99239 HOSP IP/OBS DSCHRG MGMT >30: CPT | Performed by: PSYCHIATRY & NEUROLOGY

## 2023-10-25 PROCEDURE — 5130000000 HC BRIDGE APPOINTMENT

## 2023-10-25 RX ADMIN — NICOTINE POLACRILEX 2 MG: 2 LOZENGE ORAL at 08:29

## 2023-10-25 RX ADMIN — NICOTINE POLACRILEX 2 MG: 2 LOZENGE ORAL at 11:30

## 2023-10-25 RX ADMIN — NICOTINE POLACRILEX 2 MG: 2 LOZENGE ORAL at 10:07

## 2023-10-25 RX ADMIN — ATOMOXETINE 60 MG: 40 CAPSULE ORAL at 08:29

## 2023-10-25 RX ADMIN — VENLAFAXINE HYDROCHLORIDE 75 MG: 75 CAPSULE, EXTENDED RELEASE ORAL at 08:28

## 2023-10-25 NOTE — DISCHARGE SUMMARY
OCD: none    Insight: normal insight and judgment Cognition:  oriented to person, place, and time  Fund of Knowledge: average  IQ:average Memory: intact  Suicide:  No specific plan to harm self  Sleep: sleeps through the night  Appetite: ok   Reassess Frida Risk:  no specific plan to harm self Pt has phone numbers to contact if suicidal thoughts recur and states pt will return to the hospital if suicidal feelings return.    Hospital Routine Meds:     atomoxetine  60 mg Oral Daily    nicotine  1 patch TransDERmal Daily    venlafaxine  75 mg Oral Daily with breakfast      Hospital PRN Meds: acetaminophen, nicotine polacrilex, hydrOXYzine HCl, traZODone, OLANZapine **OR** OLANZapine, magnesium hydroxide, aluminum & magnesium hydroxide-simethicone   Discharge Meds:    Current Discharge Medication List             Details   venlafaxine (EFFEXOR XR) 75 MG extended release capsule Take 1 capsule by mouth daily (with breakfast)  Qty: 30 capsule, Refills: 0      atomoxetine (STRATTERA) 60 MG capsule Take 1 capsule by mouth daily  Qty: 30 capsule, Refills: 0                Details   metoprolol succinate (TOPROL XL) 25 MG extended release tablet TAKE ONE TABLET BY MOUTH DAILY  Qty: 30 tablet, Refills: 0             Disposition - [facility]     Follow Up:  See Discharge Instructions   Riley Simpson MD  Physician Psychiatry

## 2023-10-25 NOTE — PROGRESS NOTES
Bridge Appointment completed: Reviewed Discharge Instructions with patient. Patient verbalizes understanding and agreement with the discharge plan using the teachback method.      Referral for Outpatient Tobacco Cessation Counseling, upon discharge (shadi X if applicable and completed):    ( )  Hospital staff assisted patient to call Quit Line or faxed referral                                   during hospitalization                  ( )  Recognizing danger situations (included triggers and roadblocks), if not completed on admission                    ( )  Coping skills (new ways to manage stress, exercise, relaxation techniques, changing routine, distraction), if not completed on admission                                                           ( X)  Basic information about quitting (benefits of quitting, techniques in how to quit, available resources, if not completed on admission  ( ) Referral for counseling faxed to [address] [place] [address]   ( ) Patient refused referral  ( ) Patient refused counseling  ( ) Patient refused smoking cessation medication upon discharge    Vaccinations (shadi X if applicable and completed):  ( ) Patient states already received influenza vaccine elsewhere  ( ) Patient received influenza vaccine during this hospitalization  ( ) Patient refused influenza vaccine at this time  (X ) Not offered

## 2023-10-25 NOTE — PROGRESS NOTES
[facility]  Discharge Note    Pt discharged with followings belongings:   Dental Appliances: None  Vision - Corrective Lenses: None  Hearing Aid: None  Jewelry: None  Body Piercings Removed: N/A  Clothing: Footwear, Jacket/Coat, Pants, Shirt, Undergarments  Other Valuables: Other (Comment) (None)   Valuables sent home with patient. Patient educated on aftercare instructions: reviewed with pt and he signed hospital copy Patient verbalize understanding of AVS:  a copy was given to him at discharge with prescribed medications. Status EXAM upon discharge:  Mental Status and Behavioral Exam  Normal: Yes  Level of Assistance: Independent/Self  Facial Expression: Brightened  Affect: Appropriate, Congruent  Level of Consciousness: Alert  Frequency of Checks: 4 times per hour, close  Mood:Normal: Yes  Mood: Anxious (focused on possible discharge today)  Motor Activity:Normal: Yes  Motor Activity: Increased (activity to a normal level.)  Eye Contact: Good  Observed Behavior: Cooperative, Friendly  Sexual Misconduct History: Current - no  Preception: [place] to person, [place] to time, [place] to place, [place] to situation  Attention:Normal: Yes  Attention:  Lore [place])  Thought Processes: Circumstantial, Other (comment) (linear and future oriented)  Thought Content:Normal: Yes  Thought Content:  (cora)  Depression Symptoms: No problems reported or observed. Anxiety Symptoms: Generalized  Madeline Symptoms: No problems reported or observed.   Hallucinations: None  Delusions: No  Memory:Normal: Yes  Insight and Judgment: Yes  Insight and Judgment:  (improving)    Tobacco Screening:  Practical Counseling, on admission, shadi X, if applicable and completed (first 3 are required if patient doesn't refuse):            ( ) Recognizing danger situations (included triggers and roadblocks)                    ( ) Coping skills (new ways to manage stress,relaxation techniques, changing routine, distraction)

## 2023-10-25 NOTE — BH NOTE
10-24-23 2020 to 2040. Pt did attend wrap up group. Pt seemed slightly anxious, but did speak up in group when spoken to about his goal for today. Feels he is doing much better. Pt 's plans are for him to be discharged to a substance abuse inpatient program tomorrow. Pt feels positively about this. Appropriate.

## 2023-10-25 NOTE — PLAN OF CARE
Problem: Decision Making  Goal: Pt/Family able to effectively weigh alternatives and participate in decision making related to treatment and care  Description: INTERVENTIONS:  1. Determine when there are differences between patient's view, family's view, and healthcare provider's view of condition  2. Facilitate patient and family articulation of goals for care  3. Help patient and family identify pros/cons of alternative solutions  4. Provide information as requested by patient/family  5. Respect patient/family right to receive or not to receive information  6. Serve as a liaison between patient and family and health care team  7. Initiate Consults from Ethics, Palliative Care or initiate [facility] as is appropriate  Outcome: Progressing     Problem: Depression/Self Harm  Goal: Effect of psychiatric condition will be minimized and patient will be protected from self harm  Description: INTERVENTIONS:  1. Assess impact of patient's symptoms on level of functioning, self care needs and offer support as indicated  2. Assess patient/family knowledge of depression, impact on illness and need for teaching  3. Provide emotional support, presence and reassurance  4. Assess for possible suicidal thoughts or ideation. If patient expresses suicidal thoughts or statements do not leave alone, initiate Suicide Precautions, move to a room close to the nursing station and obtain sitter  5. Initiate consults as appropriate with Mental Health Professional, Spiritual Care, Psychosocial CNS, and consider a recommendation to the LIP for a Psychiatric Consultation  Outcome: Progressing     Problem: Drug Abuse/Detox  Goal: Will have no detox symptoms and will verbalize plan for changing drug-related behavior  Description: INTERVENTIONS:  1. Administer medication as ordered  2. Monitor physical status  3. Provide emotional support with 1:1 interaction with staff  4.  Encourage  recovery focused treatment   Outcome:
Problem: Self Harm/Suicidality  Goal: Will have no self-injury during hospital stay  Description: INTERVENTIONS:  1. Ensure constant observer at bedside with Q15M safety checks  2. Maintain a safe environment  3. Secure patient belongings  4. Ensure family/visitors adhere to safety recommendations  5. Ensure safety tray has been added to patient's diet order  6. Every shift and PRN: Re-assess suicidal risk via Frequent Screener    Outcome: Progressing     Problem: Anxiety  Goal: Will report anxiety at manageable levels  Description: INTERVENTIONS:  1. Administer medication as ordered  2. Teach and rehearse alternative coping skills  3. Provide emotional support with 1:1 interaction with staff  Outcome: Progressing     Problem: Depression/Self Harm  Goal: Effect of psychiatric condition will be minimized and patient will be protected from self harm  Description: INTERVENTIONS:  1. Assess impact of patient's symptoms on level of functioning, self care needs and offer support as indicated  2. Assess patient/family knowledge of depression, impact on illness and need for teaching  3. Provide emotional support, presence and reassurance  4. Assess for possible suicidal thoughts or ideation. If patient expresses suicidal thoughts or statements do not leave alone, initiate Suicide Precautions, move to a room close to the nursing station and obtain sitter  5. Initiate consults as appropriate with Mental Health Professional, Spiritual Care, Psychosocial CNS, and consider a recommendation to the LIP for a Psychiatric Consultation  10/24/2023 7367 by Carl Rodriguez RN  Outcome: Progressing     Problem: Drug Abuse/Detox  Goal: Will have no detox symptoms and will verbalize plan for changing drug-related behavior  Description: INTERVENTIONS:  1. Administer medication as ordered  2. Monitor physical status  3. Provide emotional support with 1:1 interaction with staff  4.  Encourage  recovery focused treatment     Outcome:
Progressing   Patient has been resting this morning reporting that he is feeling still tired. He did get up for breakfast. He is A&Ox4 and denies that he is wanting to harm himself or others. He denies that he is experiencing hallucinations. He did not make any delusional statements. He is focused on getting into [facility] today for rehab. First thing this morning he was anxious about that, but once he found out he got accepted he was less anxious and more positive. He was cooperative with taking morning scheduled medications. He has been no issue.

## 2023-10-26 ENCOUNTER — FOLLOWUP TELEPHONE ENCOUNTER (OUTPATIENT)
Dept: PSYCHIATRY | Age: 33
End: 2023-10-26

## 2024-03-18 ENCOUNTER — HOSPITAL ENCOUNTER (OUTPATIENT)
Age: 34
Discharge: HOME | End: 2024-03-18
Payer: MEDICAID

## 2024-03-18 ENCOUNTER — HOSPITAL ENCOUNTER (OUTPATIENT)
Dept: GENERAL RADIOLOGY | Age: 34
Discharge: HOME | End: 2024-03-18
Payer: MEDICAID

## 2024-03-18 LAB
ALBUMIN SERPL-MCNC: 4.4 G/DL (ref 3.4–5)
ALBUMIN/GLOB SERPL: 1.5 {RATIO} (ref 1.1–2.2)
ALP SERPL-CCNC: 90 U/L (ref 40–129)
ALT SERPL-CCNC: 24 U/L (ref 10–40)
ANION GAP SERPL CALCULATED.3IONS-SCNC: 6 MMOL/L (ref 3–16)
AST SERPL-CCNC: 21 U/L (ref 15–37)
BASOPHILS # BLD: 0.1 K/UL (ref 0–0.2)
BASOPHILS NFR BLD: 0.8 %
BILIRUB SERPL-MCNC: 0.3 MG/DL (ref 0–1)
BUN SERPL-MCNC: 10 MG/DL (ref 7–20)
CALCIUM SERPL-MCNC: 9.4 MG/DL (ref 8.3–10.6)
CHLORIDE SERPL-SCNC: 102 MMOL/L (ref 99–110)
CO2 SERPL-SCNC: 30 MMOL/L (ref 21–32)
CREAT SERPL-MCNC: 0.9 MG/DL (ref 0.9–1.3)
CRP SERPL-MCNC: <3 MG/L (ref 0–5.1)
DEPRECATED RDW RBC AUTO: 14.8 % (ref 12.4–15.4)
EOSINOPHIL # BLD: 0.3 K/UL (ref 0–0.6)
EOSINOPHIL NFR BLD: 4.8 %
GFR SERPLBLD CREATININE-BSD FMLA CKD-EPI: >60 ML/MIN/{1.73_M2}
GLUCOSE SERPL-MCNC: 98 MG/DL (ref 70–99)
HCT VFR BLD AUTO: 45.2 % (ref 40.5–52.5)
HGB BLD-MCNC: 15.2 G/DL (ref 13.5–17.5)
LYMPHOCYTES # BLD: 2.3 K/UL (ref 1–5.1)
LYMPHOCYTES NFR BLD: 36.4 %
MCH RBC QN AUTO: 29.8 PG (ref 26–34)
MCHC RBC AUTO-ENTMCNC: 33.7 G/DL (ref 31–36)
MCV RBC AUTO: 88.5 FL (ref 80–100)
MONOCYTES # BLD: 0.8 K/UL (ref 0–1.3)
MONOCYTES NFR BLD: 12.6 %
NEUTROPHILS # BLD: 2.8 K/UL (ref 1.7–7.7)
NEUTROPHILS NFR BLD: 45.4 %
PLATELET # BLD AUTO: 263 K/UL (ref 135–450)
PMV BLD AUTO: 8.5 FL (ref 5–10.5)
POTASSIUM SERPL-SCNC: 4.4 MMOL/L (ref 3.5–5.1)
PROT SERPL-MCNC: 7.3 G/DL (ref 6.4–8.2)
RBC # BLD AUTO: 5.11 M/UL (ref 4.2–5.9)
SODIUM SERPL-SCNC: 138 MMOL/L (ref 136–145)
WBC # BLD AUTO: 6.3 K/UL (ref 4–11)

## 2024-03-18 PROCEDURE — 80053 COMPREHEN METABOLIC PANEL: CPT

## 2024-03-18 PROCEDURE — 86140 C-REACTIVE PROTEIN: CPT

## 2024-03-18 PROCEDURE — 71046 X-RAY EXAM CHEST 2 VIEWS: CPT

## 2024-03-18 PROCEDURE — 85025 COMPLETE CBC W/AUTO DIFF WBC: CPT

## 2024-08-19 ENCOUNTER — APPOINTMENT (OUTPATIENT)
Dept: GENERAL RADIOLOGY | Age: 34
End: 2024-08-19
Payer: MEDICAID

## 2024-08-19 ENCOUNTER — HOSPITAL ENCOUNTER (EMERGENCY)
Age: 34
Discharge: HOME | End: 2024-08-19
Attending: STUDENT IN AN ORGANIZED HEALTH CARE EDUCATION/TRAINING PROGRAM
Payer: MEDICAID

## 2024-08-19 VITALS
OXYGEN SATURATION: 99 % | DIASTOLIC BLOOD PRESSURE: 91 MMHG | TEMPERATURE: 98.6 F | HEART RATE: 93 BPM | SYSTOLIC BLOOD PRESSURE: 104 MMHG | RESPIRATION RATE: 20 BRPM

## 2024-08-19 DIAGNOSIS — J45.21 MILD INTERMITTENT ASTHMA WITH EXACERBATION: Primary | ICD-10-CM

## 2024-08-19 DIAGNOSIS — J06.9 VIRAL URI WITH COUGH: ICD-10-CM

## 2024-08-19 LAB
FLUAV RNA UPPER RESP QL NAA+PROBE: NEGATIVE
FLUBV AG NPH QL: NEGATIVE
SARS-COV-2 RDRP RESP QL NAA+PROBE: NOT DETECTED

## 2024-08-19 PROCEDURE — 6370000000 HC RX 637 (ALT 250 FOR IP): Performed by: STUDENT IN AN ORGANIZED HEALTH CARE EDUCATION/TRAINING PROGRAM

## 2024-08-19 PROCEDURE — 87804 INFLUENZA ASSAY W/OPTIC: CPT

## 2024-08-19 PROCEDURE — 87635 SARS-COV-2 COVID-19 AMP PRB: CPT

## 2024-08-19 PROCEDURE — 71046 X-RAY EXAM CHEST 2 VIEWS: CPT

## 2024-08-19 PROCEDURE — 99284 EMERGENCY DEPT VISIT MOD MDM: CPT

## 2024-08-19 RX ORDER — BENZONATATE 100 MG/1
100-200 CAPSULE ORAL 3 TIMES DAILY PRN
Qty: 60 CAPSULE | Refills: 0 | Status: SHIPPED | OUTPATIENT
Start: 2024-08-19 | End: 2024-08-29

## 2024-08-19 RX ORDER — IPRATROPIUM BROMIDE AND ALBUTEROL SULFATE 2.5; .5 MG/3ML; MG/3ML
2 SOLUTION RESPIRATORY (INHALATION) ONCE
Status: COMPLETED | OUTPATIENT
Start: 2024-08-19 | End: 2024-08-19

## 2024-08-19 RX ORDER — PREDNISONE 20 MG/1
60 TABLET ORAL ONCE
Status: COMPLETED | OUTPATIENT
Start: 2024-08-19 | End: 2024-08-19

## 2024-08-19 RX ORDER — PREDNISONE 50 MG/1
50 TABLET ORAL DAILY
Qty: 5 TABLET | Refills: 0 | Status: SHIPPED | OUTPATIENT
Start: 2024-08-20 | End: 2024-08-25

## 2024-08-19 RX ORDER — ALBUTEROL SULFATE 90 UG/1
2 AEROSOL, METERED RESPIRATORY (INHALATION) 4 TIMES DAILY PRN
Qty: 18 G | Refills: 0 | Status: SHIPPED | OUTPATIENT
Start: 2024-08-19

## 2024-08-19 RX ADMIN — IPRATROPIUM BROMIDE AND ALBUTEROL SULFATE 2 DOSE: .5; 2.5 SOLUTION RESPIRATORY (INHALATION) at 09:36

## 2024-08-19 RX ADMIN — PREDNISONE 60 MG: 20 TABLET ORAL at 09:35

## 2024-08-19 NOTE — ED NOTES
Patient presents to ED with cough and nasal congestion. States it's been ongoing x 2 days. Does live in [facility] and states that everyone has been sick. Vitals WNL. Denies thoughts of self harm. Afebrile.

## 2024-08-19 NOTE — DISCHARGE INSTRUCTIONS
Take your medications as prescribed.  Follow-up with your primary care doctor.  Return if you develop any new or worsening symptoms.

## 2024-08-19 NOTE — ED PROVIDER NOTES
up performed:   Swabs negative  Chest x-ray: No focal fine    Patient was given multiple DuoNebs as well as steroids.     Symptoms are consistent with viral URI improved after breathing treatments will be discharged.  With a course of steroids and albuterol and other medications for cough relief.    Consults (none if blank):        Shared Decision-Making was performed and disposition discussed with the patient and their questions were answered     Social determinants of health impacting treatment or disposition:  None    We discussed  appropriate Smoking/Vaping/Marijuana cessation for 5 minutes   Advised patient to quit and offered support.  Educational material provided to patient.        Code Status:    Not addressed at this visit      Vitals Reviewed:    Vitals:    08/19/24 0923   BP: (!) 104/91   Pulse: 93   Resp: 20   Temp: 98.6 °F (37 °C)   TempSrc: Oral   SpO2: 99%       The patient was seen and examined.The results of pertinent diagnostic studies and exam findings were discussed. The patient’s provisional diagnosis and plan of care were discussed with the patient  who expressed a complete understanding. Any medications were reviewed and indications and risks of medications were discussed with the patient.     Strict verbal and written return precautions, instructions and appropriate follow-up were provided as well..     ED Medications administered this visit:  (None if blank)  Medications   ipratropium 0.5 mg-albuterol 2.5 mg (DUONEB) nebulizer solution 2 Dose (2 Doses Inhalation Given 8/19/24 0936)   predniSONE (DELTASONE) tablet 60 mg (60 mg Oral Given 8/19/24 0935)         Responses to treatment:   Improved on re-eval at 943    PROCEDURES: (None if blank)  Procedures:       CRITICAL CARE: (None if blank)        DISCHARGE PRESCRIPTIONS: (None if blank)  New Prescriptions    ALBUTEROL SULFATE HFA (VENTOLIN HFA) 108 (90 BASE) MCG/ACT INHALER    Inhale 2 puffs into the lungs 4 times daily as needed for

## 2024-08-19 NOTE — ED NOTES
Patient DCed from ED at this time. Discussed AVS, follow up, and scripts. They verbalized understanding. Reinforced that should symptoms persist or worsen to return to the ED. They verbalized understanding. Patient ambulated out of ED. RN thanked patient for choosing [facility].

## 2024-10-02 LAB
ALBUMIN SERPL-MCNC: 4.1 G/DL (ref 3.4–5)
ALBUMIN/GLOB SERPL: 2.1 {RATIO} (ref 1.1–2.2)
ALP SERPL-CCNC: 75 U/L (ref 40–129)
ALT SERPL-CCNC: 17 U/L (ref 10–40)
ANION GAP SERPL CALCULATED.3IONS-SCNC: 7 MMOL/L (ref 3–16)
AST SERPL-CCNC: 19 U/L (ref 15–37)
BILIRUB SERPL-MCNC: 0.3 MG/DL (ref 0–1)
BUN SERPL-MCNC: 15 MG/DL (ref 7–20)
CALCIUM SERPL-MCNC: 9.2 MG/DL (ref 8.3–10.6)
CHLORIDE SERPL-SCNC: 106 MMOL/L (ref 99–110)
CO2 SERPL-SCNC: 27 MMOL/L (ref 21–32)
CREAT SERPL-MCNC: 0.9 MG/DL (ref 0.9–1.3)
DEPRECATED RDW RBC AUTO: 13.9 % (ref 12.4–15.4)
GFR SERPLBLD CREATININE-BSD FMLA CKD-EPI: >90 ML/MIN/{1.73_M2}
GLUCOSE SERPL-MCNC: 84 MG/DL (ref 70–99)
HAV IGM SERPL QL IA: NORMAL
HBV CORE IGM SERPL QL IA: NORMAL
HBV SURFACE AG SERPL QL IA: NORMAL
HCT VFR BLD AUTO: 45.5 % (ref 40.5–52.5)
HGB BLD-MCNC: 15.7 G/DL (ref 13.5–17.5)
MCH RBC QN AUTO: 30 PG (ref 26–34)
MCHC RBC AUTO-ENTMCNC: 34.5 G/DL (ref 31–36)
MCV RBC AUTO: 87.2 FL (ref 80–100)
PLATELET # BLD AUTO: 186 K/UL (ref 135–450)
PMV BLD AUTO: 9.4 FL (ref 5–10.5)
POTASSIUM SERPL-SCNC: 4.7 MMOL/L (ref 3.5–5.1)
PROT SERPL-MCNC: 6.1 G/DL (ref 6.4–8.2)
RBC # BLD AUTO: 5.22 M/UL (ref 4.2–5.9)
SODIUM SERPL-SCNC: 140 MMOL/L (ref 136–145)
WBC # BLD AUTO: 5.6 K/UL (ref 4–11)

## 2024-10-04 LAB
HCV RNA SERPL NAA+PROBE-ACNC: NOT DETECTED IU/ML
HCV RNA SERPL NAA+PROBE-LOG IU: NOT DETECTED LOG IU/ML
HCV RNA SERPL QL NAA+PROBE: NOT DETECTED
HIV 1+2 AB+HIV1 P24 AG SERPL QL IA: NORMAL
HIV 2 AB SERPL QL IA: NORMAL
HIV1 AB SERPL QL IA: NORMAL
HIV1 P24 AG SERPL QL IA: NORMAL

## 2024-10-05 LAB
HAV AB SER QL IA: NEGATIVE
HBV CORE AB SERPL QL IA: NEGATIVE